# Patient Record
Sex: FEMALE | Race: BLACK OR AFRICAN AMERICAN | NOT HISPANIC OR LATINO | Employment: PART TIME | URBAN - METROPOLITAN AREA
[De-identification: names, ages, dates, MRNs, and addresses within clinical notes are randomized per-mention and may not be internally consistent; named-entity substitution may affect disease eponyms.]

---

## 2020-06-14 ENCOUNTER — COMMUNICATION - HEALTHEAST (OUTPATIENT)
Dept: SCHEDULING | Facility: CLINIC | Age: 30
End: 2020-06-14

## 2021-06-08 NOTE — TELEPHONE ENCOUNTER
"Myriam calls in and reports that she thinks she is about 6 months pregnant and has not established care with an ob provider. She has had some occasional abdominal pain and some spotting. She is somewhat edemic.  She is not sure if she is feeling the baby move much.  She is going to lay down on her left side and do some kick counts.  If she is not feeling baby move enough in the next few hours she should go to ER. She understands. Otherwise she will go to see a provider tomorrow to get ready for the baby.  She agrees to these plans.     Reason for Disposition    [1] Intermittent lower abdominal pain (e.g., cramping) AND [2] present > 24 hours    MILD vaginal bleeding (e.g., < 1 pad / hour) or SPOTTING    Pregnant 23 or more weeks and baby moving less today AND willing to perform kick count    Additional Information    Negative: Passed out (i.e., lost consciousness, collapsed and was not responding)    Negative: Shock suspected (e.g., cold/pale/clammy skin, too weak to stand, low BP, rapid pulse)    Negative: Difficult to awaken or acting confused (e.g., disoriented, slurred speech)    Negative: Sounds like a life-threatening emergency to the triager    Negative: Followed an abdomen (stomach) injury    Negative: [1] Abdominal pain AND [2] pregnant > 20 weeks    Negative: Passed tissue (e.g., gray-white)    Negative: [1] Vomiting AND [2] contains red blood or black (\"coffee ground\") material  (Exception: few red streaks in vomit that only happened once)    Negative: Shoulder pain    Negative: MODERATE-SEVERE abdominal pain (e.g., interferes with normal activities, awakens from sleep)    Negative: [1] SEVERE vaginal bleeding (i.e., soaking 2 pads / hour, large blood clots) AND [2] present 2 or more hours    Negative: [1] MODERATE vaginal bleeding (i.e., soaking 1 pad / hour; clots) AND [2] present > 6 hours    Negative: [1] MODERATE vaginal bleeding (i.e., soaking 1 pad / hour; clots) AND [2] pregnant > 12 weeks    " Negative: Lightheadedness or dizziness (e.g., feels like passing out)    Negative: Patient sounds very sick or weak to the triager    Negative: [1] Constant abdominal pain AND [2] present > 2 hours    Negative: Blood in urine (red, pink, or tea-colored)    Negative: Fever > 100.4 F (38.0 C)    Negative: White of the eyes have turned yellow (i.e., jaundice)    Negative: Sounds like a life-threatening emergency to the triager    Negative: Pregnant > 36 weeks (i.e., term) and having contractions or other symptoms of labor    Negative: Pregnant < 37 weeks (i.e., ) and having contractions or other symptoms of labor    Negative: Pregnant > 20 weeks and having abdominal pain    Negative: Pregnant > 20 weeks and having vaginal bleeding or spotting    Negative: Blurred vision or visual change    Negative: SEVERE headache and not relieved with acetaminophen (e.g., Tylenol)    Negative: Leakage of fluid from vagina    Protocols used: PREGNANCY - ABDOMINAL PAIN LESS THAN 20 WEEKS EGA-A-AH, PREGNANCY - DECREASED FETAL MOVEMENT-A-OH

## 2021-06-16 PROBLEM — D62 ANEMIA ASSOCIATED WITH ACUTE BLOOD LOSS: Status: ACTIVE | Noted: 2018-12-05

## 2021-06-16 PROBLEM — F43.10 PTSD (POST-TRAUMATIC STRESS DISORDER): Status: ACTIVE | Noted: 2019-10-02

## 2021-06-16 PROBLEM — F41.9 ANXIETY: Status: ACTIVE | Noted: 2019-10-02

## 2021-06-16 PROBLEM — Z87.820 HISTORY OF MULTIPLE CONCUSSIONS: Status: ACTIVE | Noted: 2018-12-12

## 2021-06-16 PROBLEM — F41.0 PANIC ATTACKS: Status: ACTIVE | Noted: 2019-10-02

## 2022-01-25 PROCEDURE — 96374 THER/PROPH/DIAG INJ IV PUSH: CPT

## 2022-01-25 PROCEDURE — 99285 EMERGENCY DEPT VISIT HI MDM: CPT | Mod: 25

## 2022-01-25 PROCEDURE — C9803 HOPD COVID-19 SPEC COLLECT: HCPCS

## 2022-01-25 ASSESSMENT — MIFFLIN-ST. JEOR: SCORE: 1207.57

## 2022-01-26 ENCOUNTER — APPOINTMENT (OUTPATIENT)
Dept: CT IMAGING | Facility: HOSPITAL | Age: 32
End: 2022-01-26
Attending: INTERNAL MEDICINE
Payer: COMMERCIAL

## 2022-01-26 ENCOUNTER — HOSPITAL ENCOUNTER (OUTPATIENT)
Facility: HOSPITAL | Age: 32
Setting detail: OBSERVATION
Discharge: HOME | End: 2022-01-29
Attending: EMERGENCY MEDICINE
Payer: COMMERCIAL

## 2022-01-26 ENCOUNTER — APPOINTMENT (OUTPATIENT)
Dept: RADIOLOGY | Facility: HOSPITAL | Age: 32
End: 2022-01-26
Attending: EMERGENCY MEDICINE
Payer: COMMERCIAL

## 2022-01-26 DIAGNOSIS — R65.10 SIRS (SYSTEMIC INFLAMMATORY RESPONSE SYNDROME) (H): ICD-10-CM

## 2022-01-26 DIAGNOSIS — D62 ANEMIA ASSOCIATED WITH ACUTE BLOOD LOSS: ICD-10-CM

## 2022-01-26 DIAGNOSIS — F10.220 ALCOHOL DEPENDENCE WITH UNCOMPLICATED INTOXICATION (H): Primary | ICD-10-CM

## 2022-01-26 PROBLEM — F10.20 ALCOHOL DEPENDENCE (H): Status: ACTIVE | Noted: 2020-07-01

## 2022-01-26 PROBLEM — K70.30 ALCOHOLIC CIRRHOSIS OF LIVER (H): Status: ACTIVE | Noted: 2020-11-12

## 2022-01-26 PROBLEM — F17.200 TOBACCO DEPENDENCE: Status: ACTIVE | Noted: 2020-07-01

## 2022-01-26 PROBLEM — F33.1 MAJOR DEPRESSIVE DISORDER, RECURRENT EPISODE, MODERATE WITH ANXIOUS DISTRESS (H): Status: ACTIVE | Noted: 2021-04-14

## 2022-01-26 LAB
ALBUMIN SERPL-MCNC: 3.1 G/DL (ref 3.5–5)
ALBUMIN UR-MCNC: 30 MG/DL
ALP SERPL-CCNC: 144 U/L (ref 45–120)
ALT SERPL W P-5'-P-CCNC: 26 U/L (ref 0–45)
AMMONIA PLAS-SCNC: 28 UMOL/L (ref 11–35)
ANION GAP SERPL CALCULATED.3IONS-SCNC: 16 MMOL/L (ref 5–18)
APPEARANCE UR: ABNORMAL
AST SERPL W P-5'-P-CCNC: 73 U/L (ref 0–40)
BASOPHILS # BLD AUTO: 0 10E3/UL (ref 0–0.2)
BASOPHILS NFR BLD AUTO: 1 %
BILIRUB SERPL-MCNC: 0.6 MG/DL (ref 0–1)
BILIRUB UR QL STRIP: NEGATIVE
BUN SERPL-MCNC: 8 MG/DL (ref 8–22)
CALCIUM SERPL-MCNC: 8.1 MG/DL (ref 8.5–10.5)
CHLORIDE BLD-SCNC: 107 MMOL/L (ref 98–107)
CO2 SERPL-SCNC: 21 MMOL/L (ref 22–31)
COLOR UR AUTO: YELLOW
CREAT SERPL-MCNC: 0.56 MG/DL (ref 0.6–1.1)
EOSINOPHIL # BLD AUTO: 0 10E3/UL (ref 0–0.7)
EOSINOPHIL NFR BLD AUTO: 1 %
ERYTHROCYTE [DISTWIDTH] IN BLOOD BY AUTOMATED COUNT: 22.5 % (ref 10–15)
ETHANOL SERPL-MCNC: 205 MG/DL
FLUAV RNA SPEC QL NAA+PROBE: NEGATIVE
FLUBV RNA RESP QL NAA+PROBE: NEGATIVE
FOLATE SERPL-MCNC: 6.3 NG/ML
GFR SERPL CREATININE-BSD FRML MDRD: >90 ML/MIN/1.73M2
GLUCOSE BLD-MCNC: 72 MG/DL (ref 70–125)
GLUCOSE UR STRIP-MCNC: NEGATIVE MG/DL
HCG UR QL: NEGATIVE
HCT VFR BLD AUTO: 26.3 % (ref 35–47)
HGB BLD-MCNC: 7.3 G/DL (ref 11.7–15.7)
HGB UR QL STRIP: ABNORMAL
HOLD SPECIMEN: NORMAL
IMM GRANULOCYTES # BLD: 0 10E3/UL
IMM GRANULOCYTES NFR BLD: 0 %
INR PPP: 1.07 (ref 0.85–1.15)
INTERNAL QC OK POCT: NORMAL
IRON SATN MFR SERPL: 4 % (ref 20–50)
IRON SERPL-MCNC: 16 UG/DL (ref 42–175)
KETONES UR STRIP-MCNC: 10 MG/DL
LACTATE SERPL-SCNC: 3.4 MMOL/L (ref 0.7–2)
LACTATE SERPL-SCNC: 4.4 MMOL/L (ref 0.7–2)
LEUKOCYTE ESTERASE UR QL STRIP: ABNORMAL
LIPASE SERPL-CCNC: 72 U/L (ref 0–52)
LYMPHOCYTES # BLD AUTO: 0.9 10E3/UL (ref 0.8–5.3)
LYMPHOCYTES NFR BLD AUTO: 26 %
MAGNESIUM SERPL-MCNC: 1.6 MG/DL (ref 1.8–2.6)
MAGNESIUM SERPL-MCNC: 1.9 MG/DL (ref 1.8–2.6)
MCH RBC QN AUTO: 21.2 PG (ref 26.5–33)
MCHC RBC AUTO-ENTMCNC: 27.8 G/DL (ref 31.5–36.5)
MCV RBC AUTO: 77 FL (ref 78–100)
MONOCYTES # BLD AUTO: 0.7 10E3/UL (ref 0–1.3)
MONOCYTES NFR BLD AUTO: 19 %
MUCOUS THREADS #/AREA URNS LPF: PRESENT /LPF
NEUTROPHILS # BLD AUTO: 1.9 10E3/UL (ref 1.6–8.3)
NEUTROPHILS NFR BLD AUTO: 53 %
NITRATE UR QL: NEGATIVE
NRBC # BLD AUTO: 0 10E3/UL
NRBC BLD AUTO-RTO: 0 /100
PH UR STRIP: 7 [PH] (ref 5–7)
PHOSPHATE SERPL-MCNC: 3.7 MG/DL (ref 2.5–4.5)
PLATELET # BLD AUTO: 226 10E3/UL (ref 150–450)
POCT KIT EXPIRATION DATE: NORMAL
POCT KIT LOT NUMBER: NORMAL
POTASSIUM BLD-SCNC: 3.8 MMOL/L (ref 3.5–5)
PROT SERPL-MCNC: 7.2 G/DL (ref 6–8)
RBC # BLD AUTO: 3.44 10E6/UL (ref 3.8–5.2)
RBC URINE: >182 /HPF
SARS-COV-2 RNA RESP QL NAA+PROBE: NEGATIVE
SODIUM SERPL-SCNC: 144 MMOL/L (ref 136–145)
SP GR UR STRIP: 1.02 (ref 1–1.03)
SQUAMOUS EPITHELIAL: <1 /HPF
TIBC SERPL-MCNC: 424 UG/DL (ref 313–563)
TRANSFERRIN SERPL-MCNC: 339 MG/DL (ref 212–360)
TSH SERPL DL<=0.005 MIU/L-ACNC: 0.92 UIU/ML (ref 0.3–5)
UROBILINOGEN UR STRIP-MCNC: 2 MG/DL
VIT B12 SERPL-MCNC: 372 PG/ML (ref 213–816)
WBC # BLD AUTO: 3.6 10E3/UL (ref 4–11)
WBC URINE: <1 /HPF

## 2022-01-26 PROCEDURE — 96361 HYDRATE IV INFUSION ADD-ON: CPT

## 2022-01-26 PROCEDURE — 85018 HEMOGLOBIN: CPT | Performed by: EMERGENCY MEDICINE

## 2022-01-26 PROCEDURE — 999N000127 HC STATISTIC PERIPHERAL IV START W US GUIDANCE

## 2022-01-26 PROCEDURE — 83735 ASSAY OF MAGNESIUM: CPT | Mod: 91 | Performed by: EMERGENCY MEDICINE

## 2022-01-26 PROCEDURE — 81001 URINALYSIS AUTO W/SCOPE: CPT | Performed by: EMERGENCY MEDICINE

## 2022-01-26 PROCEDURE — 87040 BLOOD CULTURE FOR BACTERIA: CPT | Performed by: EMERGENCY MEDICINE

## 2022-01-26 PROCEDURE — 71045 X-RAY EXAM CHEST 1 VIEW: CPT

## 2022-01-26 PROCEDURE — 99220 PR INITIAL OBSERVATION CARE,LEVEL III: CPT | Performed by: INTERNAL MEDICINE

## 2022-01-26 PROCEDURE — 250N000011 HC RX IP 250 OP 636: Performed by: EMERGENCY MEDICINE

## 2022-01-26 PROCEDURE — 36415 COLL VENOUS BLD VENIPUNCTURE: CPT | Performed by: EMERGENCY MEDICINE

## 2022-01-26 PROCEDURE — 84466 ASSAY OF TRANSFERRIN: CPT | Performed by: INTERNAL MEDICINE

## 2022-01-26 PROCEDURE — 85610 PROTHROMBIN TIME: CPT | Performed by: EMERGENCY MEDICINE

## 2022-01-26 PROCEDURE — 83690 ASSAY OF LIPASE: CPT | Performed by: EMERGENCY MEDICINE

## 2022-01-26 PROCEDURE — 83735 ASSAY OF MAGNESIUM: CPT | Performed by: INTERNAL MEDICINE

## 2022-01-26 PROCEDURE — 83605 ASSAY OF LACTIC ACID: CPT | Performed by: EMERGENCY MEDICINE

## 2022-01-26 PROCEDURE — 36415 COLL VENOUS BLD VENIPUNCTURE: CPT | Performed by: INTERNAL MEDICINE

## 2022-01-26 PROCEDURE — 80053 COMPREHEN METABOLIC PANEL: CPT | Performed by: EMERGENCY MEDICINE

## 2022-01-26 PROCEDURE — G0378 HOSPITAL OBSERVATION PER HR: HCPCS

## 2022-01-26 PROCEDURE — 74176 CT ABD & PELVIS W/O CONTRAST: CPT

## 2022-01-26 PROCEDURE — 250N000013 HC RX MED GY IP 250 OP 250 PS 637: Performed by: EMERGENCY MEDICINE

## 2022-01-26 PROCEDURE — 250N000013 HC RX MED GY IP 250 OP 250 PS 637: Performed by: INTERNAL MEDICINE

## 2022-01-26 PROCEDURE — 82607 VITAMIN B-12: CPT | Performed by: INTERNAL MEDICINE

## 2022-01-26 PROCEDURE — 81025 URINE PREGNANCY TEST: CPT | Performed by: INTERNAL MEDICINE

## 2022-01-26 PROCEDURE — 87636 SARSCOV2 & INF A&B AMP PRB: CPT | Performed by: EMERGENCY MEDICINE

## 2022-01-26 PROCEDURE — 82077 ASSAY SPEC XCP UR&BREATH IA: CPT | Performed by: EMERGENCY MEDICINE

## 2022-01-26 PROCEDURE — 99207 PR CDG-CODE CATEGORY CHANGED: CPT | Performed by: INTERNAL MEDICINE

## 2022-01-26 PROCEDURE — 258N000003 HC RX IP 258 OP 636: Performed by: EMERGENCY MEDICINE

## 2022-01-26 PROCEDURE — 83605 ASSAY OF LACTIC ACID: CPT | Performed by: INTERNAL MEDICINE

## 2022-01-26 PROCEDURE — 82746 ASSAY OF FOLIC ACID SERUM: CPT | Performed by: INTERNAL MEDICINE

## 2022-01-26 PROCEDURE — 82140 ASSAY OF AMMONIA: CPT | Performed by: EMERGENCY MEDICINE

## 2022-01-26 PROCEDURE — 84100 ASSAY OF PHOSPHORUS: CPT | Performed by: INTERNAL MEDICINE

## 2022-01-26 PROCEDURE — 84443 ASSAY THYROID STIM HORMONE: CPT | Performed by: INTERNAL MEDICINE

## 2022-01-26 RX ORDER — MULTIPLE VITAMINS W/ MINERALS TAB 9MG-400MCG
1 TAB ORAL DAILY
Status: DISCONTINUED | OUTPATIENT
Start: 2022-01-26 | End: 2022-01-29 | Stop reason: HOSPADM

## 2022-01-26 RX ORDER — LORAZEPAM 2 MG/ML
1-2 INJECTION INTRAMUSCULAR EVERY 30 MIN PRN
Status: DISCONTINUED | OUTPATIENT
Start: 2022-01-26 | End: 2022-01-29 | Stop reason: HOSPADM

## 2022-01-26 RX ORDER — LIDOCAINE 40 MG/G
CREAM TOPICAL
Status: DISCONTINUED | OUTPATIENT
Start: 2022-01-26 | End: 2022-01-29 | Stop reason: HOSPADM

## 2022-01-26 RX ORDER — CEFTRIAXONE 1 G/1
1 INJECTION, POWDER, FOR SOLUTION INTRAMUSCULAR; INTRAVENOUS EVERY 24 HOURS
Status: DISCONTINUED | OUTPATIENT
Start: 2022-01-27 | End: 2022-01-29 | Stop reason: HOSPADM

## 2022-01-26 RX ORDER — FOLIC ACID 1 MG/1
1 TABLET ORAL DAILY
Status: DISCONTINUED | OUTPATIENT
Start: 2022-01-26 | End: 2022-01-29 | Stop reason: HOSPADM

## 2022-01-26 RX ORDER — CEFTRIAXONE 1 G/1
1 INJECTION, POWDER, FOR SOLUTION INTRAMUSCULAR; INTRAVENOUS ONCE
Status: COMPLETED | OUTPATIENT
Start: 2022-01-26 | End: 2022-01-26

## 2022-01-26 RX ORDER — SODIUM CHLORIDE 9 MG/ML
INJECTION, SOLUTION INTRAVENOUS CONTINUOUS
Status: DISCONTINUED | OUTPATIENT
Start: 2022-01-26 | End: 2022-01-27

## 2022-01-26 RX ORDER — OLANZAPINE 5 MG/1
5-10 TABLET, ORALLY DISINTEGRATING ORAL EVERY 6 HOURS PRN
Status: DISCONTINUED | OUTPATIENT
Start: 2022-01-26 | End: 2022-01-29 | Stop reason: HOSPADM

## 2022-01-26 RX ORDER — HALOPERIDOL 5 MG/ML
2.5-5 INJECTION INTRAMUSCULAR EVERY 6 HOURS PRN
Status: DISCONTINUED | OUTPATIENT
Start: 2022-01-26 | End: 2022-01-29 | Stop reason: HOSPADM

## 2022-01-26 RX ORDER — LORAZEPAM 0.5 MG/1
1 TABLET ORAL ONCE
Status: COMPLETED | OUTPATIENT
Start: 2022-01-26 | End: 2022-01-26

## 2022-01-26 RX ORDER — LORAZEPAM 0.5 MG/1
1-2 TABLET ORAL EVERY 30 MIN PRN
Status: DISCONTINUED | OUTPATIENT
Start: 2022-01-26 | End: 2022-01-29 | Stop reason: HOSPADM

## 2022-01-26 RX ORDER — FLUMAZENIL 0.1 MG/ML
0.2 INJECTION, SOLUTION INTRAVENOUS
Status: DISCONTINUED | OUTPATIENT
Start: 2022-01-26 | End: 2022-01-29 | Stop reason: HOSPADM

## 2022-01-26 RX ORDER — CLONIDINE HYDROCHLORIDE 0.1 MG/1
0.1 TABLET ORAL EVERY 8 HOURS
Status: DISCONTINUED | OUTPATIENT
Start: 2022-01-26 | End: 2022-01-29 | Stop reason: HOSPADM

## 2022-01-26 RX ADMIN — SODIUM CHLORIDE: 9 INJECTION, SOLUTION INTRAVENOUS at 04:22

## 2022-01-26 RX ADMIN — LORAZEPAM 1 MG: 0.5 TABLET ORAL at 02:07

## 2022-01-26 RX ADMIN — CEFTRIAXONE 1 G: 1 INJECTION, POWDER, FOR SOLUTION INTRAMUSCULAR; INTRAVENOUS at 03:49

## 2022-01-26 RX ADMIN — Medication 100 MG: at 10:42

## 2022-01-26 RX ADMIN — FOLIC ACID 1 MG: 1 TABLET ORAL at 10:42

## 2022-01-26 RX ADMIN — CLONIDINE HYDROCHLORIDE 0.1 MG: 0.1 TABLET ORAL at 10:42

## 2022-01-26 RX ADMIN — Medication 1 TABLET: at 10:44

## 2022-01-26 RX ADMIN — CLONIDINE HYDROCHLORIDE 0.1 MG: 0.1 TABLET ORAL at 17:51

## 2022-01-26 RX ADMIN — SODIUM CHLORIDE: 9 INJECTION, SOLUTION INTRAVENOUS at 17:51

## 2022-01-26 RX ADMIN — SODIUM CHLORIDE 500 ML: 9 INJECTION, SOLUTION INTRAVENOUS at 03:28

## 2022-01-26 ASSESSMENT — MIFFLIN-ST. JEOR: SCORE: 1212.5

## 2022-01-26 ASSESSMENT — ACTIVITIES OF DAILY LIVING (ADL): DEPENDENT_IADLS:: TRANSPORTATION

## 2022-01-26 NOTE — CONSULTS
"Care Management Initial Consult    General Information  Assessment completed with: PatientMyriam  Type of CM/SW Visit: Initial Assessment    Primary Care Provider verified and updated as needed: Yes   Readmission within the last 30 days: no previous admission in last 30 days      Reason for Consult: discharge planning  Advance Care Planning: Advance Care Planning Reviewed: other (comment) (\"I don't have one\")          Communication Assessment  Patient's communication style: spoken language (English or Bilingual)    Hearing Difficulty or Deaf: no   Wear Glasses or Blind: no    Cognitive  Cognitive/Neuro/Behavioral: WDL                      Living Environment:   People in home: alone,parent(s),child(ralf), dependent,sibling(s) (\"mother, son age 12, brother\")     Current living Arrangements: house      Able to return to prior arrangements: yes       Family/Social Support:  Care provided by: self  Provides care for: child(ralf) (\"son age 12\")     Parent(s),Sibling(s)          Description of Support System: Supportive,Involved    Support Assessment: Adequate family and caregiver support,Adequate social supports,Patient communicates needs well met    Current Resources:   Patient receiving home care services: No     Community Resources: None  Equipment currently used at home: none  Supplies currently used at home: Other (\"glasses\")    Employment/Financial:  Employment Status: employed part-time (\"I work part time on weekends\")     Employment/ Comments: \"no  history\"  Financial Concerns:     Referral to Financial Counselor: No       Lifestyle & Psychosocial Needs:  Social Determinants of Health     Tobacco Use: High Risk     Smoking Tobacco Use: Current Every Day Smoker     Smokeless Tobacco Use: Unknown   Alcohol Use: Not on file   Financial Resource Strain: Not on file   Food Insecurity: Not on file   Transportation Needs: Not on file   Physical Activity: Not on file   Stress: Not on file   Social " Connections: Not on file   Intimate Partner Violence: Not on file   Depression: Not on file   Housing Stability: Not on file       Functional Status:  Prior to admission patient needed assistance:   Dependent ADLs:: Independent,Ambulation-no assistive device  Dependent IADLs:: Transportation       Mental Health Status:          Chemical Dependency Status:  Chemical Dependency Status: Current Concern             Values/Beliefs:  Spiritual, Cultural Beliefs, Church Practices, Values that affect care:                 Additional Information:  Myriam lives in a house with her son age 12, mother, and brother. She is independent with with ADLs at baseline. She does not drive.    She will need CD resources before discharge.    Family to transport at discharge.    What is Observation given.    Olga Snow RN

## 2022-01-26 NOTE — H&P
ADMISSION HISTORY & PHYSICAL    CODE STATUS:  Full code  Shantell Ruffin, [phone]    Patient YOB: 1990  Admit date: 1/26/2022  Date of Service: 1/26/2022    ASSESSMENT AND PLAN:  31 year old female with PMH of alcohol abuse, liver cirrhosis, chronic anemia, PTSD who presents to our ED for evaluation of weakness, tremors and abdominal pain:    Generalized weakness  Anemia  -- Anemia is microcytic with MCV of 77. B12 was normal on 3/10/21  -- Hgb of 7.3 noted. Check iron studies, TSH, B12 and folate level. Check FOBT  -- At risk for variceal bleeds given her h/o alcohol induced liver disease and portal hypertension    Alcohol abuse  -- BAL noted on admission at 205  -- Advised to quit.  consult  -- High risk for withdrawal. Start CIWA protocol    Alcohol induced liver disease  -- Abnormal LFTs noted  -- Follow up with GI recommended    Lactic acidosis  Fever  -- Patient had a fever of 100.5F in ED.   -- Source of infection is unclear at the moment. Chest xray- negative. Check UA. Check procal. Will tap ascites if she has any.    Disposition:  -Anticipated Length of Stay in midnights and medical necessity (including a midnight in the Emergency Department after triage if applicable): >2      CHIEF COMPLAINT:  Generalized weakness, abdominal pain and tremors    HISTORY OF PRESENTING ILLNESS:  31 year old female with PMH of alcohol abuse, liver cirrhosis, chronic anemia, PTSD who presents to our ED for evaluation of weakness, tremors and abdominal pain    Patient is a poor historian. Patient has felt unwell for the past several days, but became worse last night. Reports lower abdominal pain, generalized weakness, dizziness, tremors, and feels very cold. Has a history of liver disease related to alcohol use, last drank ~3 days ago. Not vaccinated for Covid-19. She has also had increased anxiety recently and has had 3 panic attacks within the past 1 week. Otherwise denies decreased  appetite, nausea, vomiting, urinary symptoms, or any other complaints at this time. Does not take any daily prescription medications.    PMH/PSH:  Patient Active Problem List   Diagnosis     Anxiety     Anemia associated with acute blood loss     Obesity     History of multiple concussions     PTSD (post-traumatic stress disorder)     Panic attacks     Alcohol dependence (H)     Alcoholic cirrhosis of liver (H)     Major depressive disorder, recurrent episode, moderate with anxious distress (H)     Normal spontaneous vaginal delivery     Tobacco dependence     SIRS (systemic inflammatory response syndrome) (H)       Past Medical History:   Diagnosis Date     Anxiety      Obesity         History reviewed. No pertinent surgical history.       ALLERGIES:  No Known Allergies    MEDICATIONS:  Reviewed.  Current Facility-Administered Medications   Medication     sodium chloride 0.9% infusion     No current outpatient medications on file.       Current Facility-Administered Medications:      [COMPLETED] 0.9% sodium chloride BOLUS, 500 mL, Intravenous, Once, Stopped at 01/26/22 0348 **FOLLOWED BY** sodium chloride 0.9% infusion, , Intravenous, Continuous, Dallas Ramos MD, Last Rate: 125 mL/hr at 01/26/22 0422, Started at 01/26/22 0422  No current outpatient medications on file.   Scheduled Meds:  Continuous Infusions:    sodium chloride 125 mL/hr at 01/26/22 0422     PRN Meds:.    SOCIAL HISTORY:  Social History     Socioeconomic History     Marital status: Single     Spouse name: Not on file     Number of children: Not on file     Years of education: Not on file     Highest education level: Not on file   Occupational History     Not on file   Tobacco Use     Smoking status: Not on file     Smokeless tobacco: Not on file   Substance and Sexual Activity     Alcohol use: Not on file     Drug use: Not on file     Sexual activity: Not on file   Other Topics Concern     Not on file   Social History Narrative     Not on file      Social Determinants of Health     Financial Resource Strain: Not on file   Food Insecurity: Not on file   Transportation Needs: Not on file   Physical Activity: Not on file   Stress: Not on file   Social Connections: Not on file   Intimate Partner Violence: Not on file   Housing Stability: Not on file       FAMILY HISTORY:  Reviewed and is not pertinent to this admission    ROS:  12 point review of systems reviewed and is negative except for what has already been mentioned in HPI.       PHYSICAL EXAM:  GENRL:  Not in acute distress   /61   Pulse 88   Temp 99.9  F (37.7  C) (Oral)   Resp 22   Ht 1.524 m (5')   Wt 57.6 kg (127 lb)   SpO2 99%   BMI 24.80 kg/m    No intake/output data recorded.  No intake/output data recorded.  HEENT: NC/AT      Eyes-  Pupil round and reactive to light bilaterally       Neck- supple, no JVP elevation,       Sclera- anicteric      Oropharynx- moist and pink  CHEST: Clear to auscultation bilateral anteriorly, no ronchi or wheezing  HEART: S1S2 normal, regular.   ABDMN: Soft. Non-tender, non-distended.  No guarding or rigidity. Bowel sounds- active  EXTRM: No pedal edema   INTGM: No skin rash, no cyanosis or clubbing  MUSCULOSKELETAL: no joint tenderness or swelling on upper and lower extremities  NEURO: Alert and awake. Cranial nerves II-XII grossly intact. No focal neurological deficit.  PSYCH:     GENITOURINARY:       DIAGNOSTIC DATA:    Recent Labs   Lab 01/26/22  0155   WBC 3.6*   HGB 7.3*   HCT 26.3*          Recent Labs   Lab 01/26/22  0155      CO2 21*   BUN 8       Recent Labs   Lab 01/26/22  0155   INR 1.07       Recent Labs   Lab 01/26/22  0155      CO2 21*   BUN 8   ALBUMIN 3.1*   ALKPHOS 144*   ALT 26   AST 73*       XR Chest Port 1 View    Result Date: 1/26/2022  EXAM: XR CHEST PORT 1 VIEW LOCATION: [facility] DATE/TIME: 1/26/2022 3:42 AM INDICATION: fever COMPARISON: 2/17/2020     IMPRESSION: Negative  chest.      Patient's new lab studies reviewed personally.  Patient's new radiology reports reviewed personally.  I personally viewed and personally interpreted patient's EKG:    Note created using dragon voice recognition software.  Errors in spelling or words which seems out of context are unintentional.  Sounds alike errors may have escaped editing.     01/26/2022  Saleem Kapoor MD  HOSPITALIST, [facility]  PAGER NO. [phone]

## 2022-01-26 NOTE — ED PROVIDER NOTES
EMERGENCY DEPARTMENT ENCOUNTER            IMPRESSION:  Acute alcohol intoxication  History of cirrhosis  SIRS      MEDICAL DECISION MAKING:  Patient brought in for evaluation of generalized illness.  She reported chills and lower abdominal pain.  She has become increasingly weak and unable to ambulate.  She has a history of chronic alcohol abuse and cirrhosis.  She initially denied recent alcohol intake.     On exam she appeared moderately ill.  She is febrile and tachycardic.  She had difficulty with transfer from the wheelchair to the [facility].  She is awake and alert and responsive. She had upper extremity tremor. She appears to hydrated.  There is no abdominal tenderness.    An IV was established and she was given fluid bolus.    She was given a dose of Ativan for suspected alcohol withdrawal, however her blood alcohol was 205    Lipase slightly elevated    Chemistry is unremarkable.  LFTs are slightly elevated but down from baseline    Flu and Covid are negative    Lactic acid is elevated at 4.4    CBC has normal white blood cell count and baseline anemia    Urinalysis was ordered and results are pending    Chest x-ray is negative for pneumonia    Blood cultures were ordered    Entire picture here is not clear.  She is obviously intoxicated and this is contributing to some of her symptoms.    She has evidence of SIRS with the fever and elevated lactate.  I have given a dose of broad-spectrum antibiotics.    I spoke with the hospitalist for admission      =================================================================  CHIEF COMPLAINT:  Chief Complaint   Patient presents with     Generalized Weakness     Anxiety         HPI  Myriam Porter is a 31 year old female with a history of alcohol dependence, alcoholic liver cirrhosis, tobacco dependence, anxiety, panic attacks, and anemia who presents to the ED with mother for evaluation of abdominal pain, generalized weakness, and tremors.     Patient has felt  unwell for the past several days, but became worse tonight. Reports lower abdominal pain, generalized weakness, dizziness, tremors, and feels very cold. Has a history of liver disease related to alcohol use, last drank ~3 days ago. Unsure if she has ever had alcohol withdrawal before. Not vaccinated for Covid-19.     She has also had increased anxiety recently and has had 3 panic attacks within the past 1 week. Otherwise denies decreased appetite, nausea, vomiting, urinary symptoms, or any other complaints at this time. Does not take any daily prescription medications.       I, Cathy Roxanne am serving as a scribe to document services personally performed by Dr. Dallas Ramos MD, based on my observation and the provider's statements to me. I, Dr. Dallas Ramos MD attest that Cathy Oliveira is acting in a scribe capacity, has observed my performance of the services and has documented them in accordance with my direction.      REVIEW OF SYSTEMS   Constitutional: Denies fever, chills, unintentional weight loss, or decreased appetite. Reports generalized weakness  Eyes: Denies visual changes or discharge    HENT: Denies sore throat, ear pain or neck pain  Respiratory: Denies cough. Reports shortness of breath    Cardiovascular: Denies chest pain, palpitations or leg swelling  GI: Denies nausea, vomiting, or dark, bloody stools. Reports lower abdominal pain.  : Denies hematuria, dysuria, or flank pain  Musculoskeletal: Denies any new joint pains. Reports diffuse back pain  Skin: Denies rash or wound  Neurologic: Denies current headache, new focal weakness, or sensory changes. Reports dizziness and tremors  Lymphatic: Denies swollen glands    Psychiatric: Denies depression, suicidal ideation or homicidal ideation. Reports anxiety    Remainder of systems reviewed, unless noted in HPI all others negative.      PAST MEDICAL HISTORY:  Past Medical History:   Diagnosis Date     Anxiety      Obesity        PAST SURGICAL  HISTORY:  History reviewed. No pertinent surgical history.      CURRENT MEDICATIONS:    No current outpatient medications on file.      ALLERGIES:  No Known Allergies    FAMILY HISTORY:  History reviewed. No pertinent family history.    SOCIAL HISTORY:   Social History     Socioeconomic History     Marital status: Single     Spouse name: Not on file     Number of children: Not on file     Years of education: Not on file     Highest education level: Not on file   Occupational History     Not on file   Tobacco Use     Smoking status: Not on file     Smokeless tobacco: Not on file   Substance and Sexual Activity     Alcohol use: Not on file     Drug use: Not on file     Sexual activity: Not on file   Other Topics Concern     Not on file   Social History Narrative     Not on file     Social Determinants of Health     Financial Resource Strain: Not on file   Food Insecurity: Not on file   Transportation Needs: Not on file   Physical Activity: Not on file   Stress: Not on file   Social Connections: Not on file   Intimate Partner Violence: Not on file   Housing Stability: Not on file       PHYSICAL EXAM:    /64   Pulse 97   Temp (!) 100.5  F (38.1  C) (Temporal)   Resp 22   Ht 1.524 m (5')   Wt 57.6 kg (127 lb)   SpO2 99%   BMI 24.80 kg/m      Constitutional: Awake, alert, appears moderately ill  Head: Normocephalic, atraumatic.  ENT: Mucous membranes moist. Posterior oropharynx appears normal.  Eyes: Pupils midrange and reactive ,no conjunctival discharge  Neck: No lymphadenopathy, no stridor, supple, soft tissue swelling  Chest: No tenderness   Respiratory: Respirations even, unlabored. Lungs clear to ascultation bilaterally, in no acute respiratory distress.  Cardiovascular: Regular rate and rhythm.+2 radial pulses, equal bilaterally.  No murmurs.   GI: Abdomen soft, non-tender to palpation in all 4 quadrants. No guarding or rebound. Bowel sounds intact on all 4 quadrants.   Back: No CVA tenderness.     Musculoskeletal: She seems generally weak.  Upper extremity tremor  Integument: Warm, dry. No rash. No bruising or petechiae.  Skin feels warm  Lymphatic: No cervical lymphadenopathy  Neurologic: Alert & oriented x 3. Normal speech.  Extremities are diffusely weak  Psychiatric: Normal mood and affect. Normal judgement.    ED COURSE:    1:06AM I met with the patient for the initial interview and physical examination. Discussed plan for treatment and workup in the ED. PPE: Provider wore surgical mask.  4:32 AM I reassessed the patient and updated her on the results. I discussed admission with the patient. Patient is agreeable. All questions answered fully.     LAB:  All pertinent labs reviewed and interpreted.  Results for orders placed or performed during the hospital encounter of 01/26/22   Result Value Ref Range    INR 1.07 0.85 - 1.15   Comprehensive metabolic panel   Result Value Ref Range    Sodium 144 136 - 145 mmol/L    Potassium 3.8 3.5 - 5.0 mmol/L    Chloride 107 98 - 107 mmol/L    Carbon Dioxide (CO2) 21 (L) 22 - 31 mmol/L    Anion Gap 16 5 - 18 mmol/L    Urea Nitrogen 8 8 - 22 mg/dL    Creatinine 0.56 (L) 0.60 - 1.10 mg/dL    Calcium 8.1 (L) 8.5 - 10.5 mg/dL    Glucose 72 70 - 125 mg/dL    Alkaline Phosphatase 144 (H) 45 - 120 U/L    AST 73 (H) 0 - 40 U/L    ALT 26 0 - 45 U/L    Protein Total 7.2 6.0 - 8.0 g/dL    Albumin 3.1 (L) 3.5 - 5.0 g/dL    Bilirubin Total 0.6 0.0 - 1.0 mg/dL    GFR Estimate >90 >60 mL/min/1.73m2   Result Value Ref Range    Lipase 72 (H) 0 - 52 U/L   Lactic acid whole blood   Result Value Ref Range    Lactic Acid 4.4 (HH) 0.7 - 2.0 mmol/L   Result Value Ref Range    Ammonia 28 11 - 35 umol/L   Result Value Ref Range    Magnesium 1.9 1.8 - 2.6 mg/dL   Ethyl Alcohol Level   Result Value Ref Range    Alcohol, Blood 205 (H) None detected mg/dL   Symptomatic; Unknown Influenza A/B & SARS-CoV2 (COVID-19) Virus PCR Multiplex Nasopharyngeal    Specimen: Nasopharyngeal; Swab   Result  Value Ref Range    Influenza A PCR Negative Negative    Influenza B PCR Negative Negative    SARS CoV2 PCR Negative Negative   CBC with platelets and differential   Result Value Ref Range    WBC Count 3.6 (L) 4.0 - 11.0 10e3/uL    RBC Count 3.44 (L) 3.80 - 5.20 10e6/uL    Hemoglobin 7.3 (L) 11.7 - 15.7 g/dL    Hematocrit 26.3 (L) 35.0 - 47.0 %    MCV 77 (L) 78 - 100 fL    MCH 21.2 (L) 26.5 - 33.0 pg    MCHC 27.8 (L) 31.5 - 36.5 g/dL    RDW 22.5 (H) 10.0 - 15.0 %    Platelet Count 226 150 - 450 10e3/uL    % Neutrophils 53 %    % Lymphocytes 26 %    % Monocytes 19 %    % Eosinophils 1 %    % Basophils 1 %    % Immature Granulocytes 0 %    NRBCs per 100 WBC 0 <1 /100    Absolute Neutrophils 1.9 1.6 - 8.3 10e3/uL    Absolute Lymphocytes 0.9 0.8 - 5.3 10e3/uL    Absolute Monocytes 0.7 0.0 - 1.3 10e3/uL    Absolute Eosinophils 0.0 0.0 - 0.7 10e3/uL    Absolute Basophils 0.0 0.0 - 0.2 10e3/uL    Absolute Immature Granulocytes 0.0 <=0.4 10e3/uL    Absolute NRBCs 0.0 10e3/uL       RADIOLOGY:  Reviewed all pertinent imaging. Please see official radiology report.  XR Chest Port 1 View    (Results Pending)            MEDICATIONS GIVEN IN THE EMERGENCY:  Medications   0.9% sodium chloride BOLUS (0 mLs Intravenous Stopped 1/26/22 0348)     Followed by   sodium chloride 0.9% infusion (has no administration in time range)   cefTRIAXone (ROCEPHIN) 1 g vial to attach to  mL bag for ADULTS or NS 50 mL bag for PEDS (1 g Intravenous New Bag 1/26/22 0349)   LORazepam (ATIVAN) tablet 1 mg (1 mg Oral Given 1/26/22 0207)           NEW PRESCRIPTIONS STARTED AT TODAY'S ER VISIT:  New Prescriptions    No medications on file          FINAL DIAGNOSIS:    ICD-10-CM    1. SIRS (systemic inflammatory response syndrome) (H)  R65.10             At the conclusion of the encounter I discussed the results of all of the tests and the disposition. The questions were answered. The patient or family acknowledged understanding and was agreeable with  the care plan.     NAME: Myriam Porter  AGE: 31 year old female  YOB: 1990  MRN: [id number]  EVALUATION DATE & TIME: No admission date for patient encounter.    PCP: Shantell Ruffin    ED PROVIDER: Dallas Ramos M.D.      Cathy TRIMBLE, am serving as a scribe to document services personally performed by Dr. Dallas Ramos based on my observation and the provider's statements to me. I, Dallas Ramos MD attest that Cathy Oliveira is acting in a scribe capacity, has observed my performance of the services and has documented them in accordance with my direction.    Dallas Ramos M.D.  Emergency Medicine  [facility] EMERGENCY DEPARTMENT  [facility] [place] [address]  [phone]  Dept: [phone]  1/26/2022       Dallas Ramos MD  01/26/22 0520

## 2022-01-26 NOTE — ED NOTES
"ER BOARDING NOTE:    Drowsy, sleeping.  Wakens to voice.  Oriented.  Having oral tremors upon oral temperature assessment.  Oriented.  CIWA score 3.    On RA, sats are 100%.  No distress.  HR 80s.  No CP.  Wants to eat, \"I haven't eaten since yesterday.\"  C/o mild nausea.   Haven't voided.  Will need specimen.  VS obtained: /72   Pulse 81   Temp 98.3  F (36.8  C) (Oral)   Resp 18   Ht 1.524 m (5')   Wt 57.6 kg (127 lb)   SpO2 100%   BMI 24.80 kg/m  .  NS 125mL/hr via 18g RAC.  Plan: waiting for IP bed.   "

## 2022-01-26 NOTE — PROGRESS NOTES
Call from lab for critical hemoglobin of 6.4. No source of bleeding and did receive bolus and maintenance fluids, so likely dilutional. Rest of CBC not back yet so can't see other cell lines to confirm.     Spoke with patient about transfusion. She has had in the past and did not have a good experience. States that the IV kept leaking and she had to get it done several times. Felt like a lot of pressure going in at once. Discussed risks and benefits. She is going to think about it.    Will revisit in 15 minutes.    Colt Jackson MD - PGY2  [facility]  P: [phone]    -------------    11:16 AM    Patient still thinking. Wants to wait until mom is here to discuss with her, which I think is reasonable given the above. Will re-visit in an hour.    -------------    12:48 PM    Spoke to lab as to why hemoglobin still isn't in the results. Gustavo reported that it was ran off of lactic acid. It still needs to be reported to a provider if critical but since it wasn't specifically ordered it isn't added to the results section. Myriam's mom still not here to discuss. Pt thinks she is on her way.

## 2022-01-26 NOTE — PHARMACY-ADMISSION MEDICATION HISTORY
Admission Medication History Completed by Pharmacy    See [facility] Admission Navigator for allergy information, preferred outpatient pharmacy, prior to admission medications and immunization status.     Medication History Sources:     Patient    Care Everywhere records    Rx refill hx    Changes made to PTA medication list (reason):    Added: None    Deleted: None    Changed: None    Additional Information:    None    Prior to Admission medications    Not on File       Date completed: 01/26/22    Medication history completed by:   Buffy Krishnamurthy PharmD  January 26, 2022

## 2022-01-26 NOTE — ED NOTES
[facility] ED Handoff Report    ED Chief Complaint: generalized illness    ED Diagnosis:  (R65.10) SIRS (systemic inflammatory response syndrome) (H)  Comment: +fever and elevated lactate.  No fevers today.  Repeat lactate of 3.4.    Plan: Fever control and IVF.         PMH:    Past Medical History:   Diagnosis Date     Anxiety      Obesity         Code Status:  Full Code     Falls Risk: No Band: Not applicable    Current Living Situation/Residence: lives with family.     Elimination Status: Continent: Yes.  Has UTI.       Activity Level: SBA    Patients Preferred Language:  English     Needed: No    Vital Signs:  /72   Pulse 81   Temp 98.3  F (36.8  C) (Oral)   Resp 18   Ht 1.524 m (5')   Wt 57.6 kg (127 lb)   SpO2 100%   BMI 24.80 kg/m       Cardiac Rhythm: NSR    Pain Score: 0/10    Is the Patient Confused:  No    Last Food or Drink: 01/26/22 at lunch    Focused Assessment:  Strength, LFTs    Tests Performed: Done: Labs and Imaging    Treatments Provided:  MIYA 3, 1.     Family Dynamics/Concerns: Yes; please explain: Mom in waiting room trying to get in despite visitor policy.     Family Updated On Visitor Policy: Yes    Plan of Care Communicated to Family: Yes    Who Was Updated about Plan of Care: Mom    Belongings Checklist Done and Signed by Patient: No    Medications sent with patient: none    Covid: asymptomatic , negative    Additional Information: JACIEL + upon arrival.

## 2022-01-26 NOTE — ED TRIAGE NOTES
Patient reports weakness for last couple of months. States it is difficult to stand up and often need to sit back down due to feeling lightheaded. Patient also reports increased anxiety and states she has had 3 panic attacks this past week. Patient also reports abd pain and numbness in her lower back.

## 2022-01-27 LAB
ALBUMIN SERPL-MCNC: 2.6 G/DL (ref 3.5–5)
ALP SERPL-CCNC: 134 U/L (ref 45–120)
ALT SERPL W P-5'-P-CCNC: 23 U/L (ref 0–45)
ANION GAP SERPL CALCULATED.3IONS-SCNC: 8 MMOL/L (ref 5–18)
AST SERPL W P-5'-P-CCNC: 90 U/L (ref 0–40)
BASOPHILS # BLD AUTO: 0 10E3/UL (ref 0–0.2)
BASOPHILS NFR BLD AUTO: 1 %
BILIRUB SERPL-MCNC: 0.4 MG/DL (ref 0–1)
BUN SERPL-MCNC: 4 MG/DL (ref 8–22)
CALCIUM SERPL-MCNC: 7.9 MG/DL (ref 8.5–10.5)
CHLORIDE BLD-SCNC: 107 MMOL/L (ref 98–107)
CO2 SERPL-SCNC: 22 MMOL/L (ref 22–31)
CREAT SERPL-MCNC: 0.58 MG/DL (ref 0.6–1.1)
EOSINOPHIL # BLD AUTO: 0.1 10E3/UL (ref 0–0.7)
EOSINOPHIL NFR BLD AUTO: 2 %
ERYTHROCYTE [DISTWIDTH] IN BLOOD BY AUTOMATED COUNT: 21.6 % (ref 10–15)
ERYTHROCYTE [DISTWIDTH] IN BLOOD BY AUTOMATED COUNT: 22.1 % (ref 10–15)
GFR SERPL CREATININE-BSD FRML MDRD: >90 ML/MIN/1.73M2
GLUCOSE BLD-MCNC: 106 MG/DL (ref 70–125)
HCT VFR BLD AUTO: 23 % (ref 35–47)
HCT VFR BLD AUTO: 23.7 % (ref 35–47)
HGB BLD-MCNC: 6.4 G/DL (ref 11.7–15.7)
HGB BLD-MCNC: 6.5 G/DL (ref 11.7–15.7)
IMM GRANULOCYTES # BLD: 0 10E3/UL
IMM GRANULOCYTES NFR BLD: 0 %
LACTATE SERPL-SCNC: 1.2 MMOL/L (ref 0.7–2)
LYMPHOCYTES # BLD AUTO: 1.1 10E3/UL (ref 0.8–5.3)
LYMPHOCYTES NFR BLD AUTO: 31 %
MAGNESIUM SERPL-MCNC: 1.6 MG/DL (ref 1.8–2.6)
MCH RBC QN AUTO: 21.2 PG (ref 26.5–33)
MCH RBC QN AUTO: 21.3 PG (ref 26.5–33)
MCHC RBC AUTO-ENTMCNC: 27.4 G/DL (ref 31.5–36.5)
MCHC RBC AUTO-ENTMCNC: 27.8 G/DL (ref 31.5–36.5)
MCV RBC AUTO: 77 FL (ref 78–100)
MCV RBC AUTO: 78 FL (ref 78–100)
MONOCYTES # BLD AUTO: 0.6 10E3/UL (ref 0–1.3)
MONOCYTES NFR BLD AUTO: 17 %
NEUTROPHILS # BLD AUTO: 1.8 10E3/UL (ref 1.6–8.3)
NEUTROPHILS NFR BLD AUTO: 49 %
NRBC # BLD AUTO: 0 10E3/UL
NRBC BLD AUTO-RTO: 0 /100
PATH REPORT.COMMENTS IMP SPEC: NORMAL
PATH REPORT.FINAL DX SPEC: NORMAL
PATH REPORT.MICROSCOPIC SPEC OTHER STN: NORMAL
PATH REPORT.RELEVANT HX SPEC: NORMAL
PLATELET # BLD AUTO: 160 10E3/UL (ref 150–450)
PLATELET # BLD AUTO: 174 10E3/UL (ref 150–450)
POTASSIUM BLD-SCNC: 3.6 MMOL/L (ref 3.5–5)
PROCALCITONIN SERPL-MCNC: 0.05 NG/ML (ref 0–0.49)
PROT SERPL-MCNC: 5.9 G/DL (ref 6–8)
RBC # BLD AUTO: 3 10E6/UL (ref 3.8–5.2)
RBC # BLD AUTO: 3.06 10E6/UL (ref 3.8–5.2)
RETICS # AUTO: 0.06 10E6/UL (ref 0.01–0.11)
RETICS/RBC NFR AUTO: 1.8 % (ref 0.8–2.7)
SODIUM SERPL-SCNC: 137 MMOL/L (ref 136–145)
WBC # BLD AUTO: 3.6 10E3/UL (ref 4–11)
WBC # BLD AUTO: 3.9 10E3/UL (ref 4–11)

## 2022-01-27 PROCEDURE — 36415 COLL VENOUS BLD VENIPUNCTURE: CPT | Performed by: PHYSICIAN ASSISTANT

## 2022-01-27 PROCEDURE — 250N000011 HC RX IP 250 OP 636: Performed by: INTERNAL MEDICINE

## 2022-01-27 PROCEDURE — 82040 ASSAY OF SERUM ALBUMIN: CPT | Performed by: INTERNAL MEDICINE

## 2022-01-27 PROCEDURE — G0378 HOSPITAL OBSERVATION PER HR: HCPCS

## 2022-01-27 PROCEDURE — 99226 PR SUBSEQUENT OBSERVATION CARE,LEVEL III: CPT | Performed by: INTERNAL MEDICINE

## 2022-01-27 PROCEDURE — 86364 TISS TRNSGLTMNASE EA IG CLAS: CPT | Mod: 59 | Performed by: INTERNAL MEDICINE

## 2022-01-27 PROCEDURE — 84145 PROCALCITONIN (PCT): CPT | Performed by: INTERNAL MEDICINE

## 2022-01-27 PROCEDURE — 83605 ASSAY OF LACTIC ACID: CPT | Performed by: PHYSICIAN ASSISTANT

## 2022-01-27 PROCEDURE — 85045 AUTOMATED RETICULOCYTE COUNT: CPT | Performed by: INTERNAL MEDICINE

## 2022-01-27 PROCEDURE — 36415 COLL VENOUS BLD VENIPUNCTURE: CPT | Performed by: INTERNAL MEDICINE

## 2022-01-27 PROCEDURE — 250N000013 HC RX MED GY IP 250 OP 250 PS 637: Performed by: INTERNAL MEDICINE

## 2022-01-27 PROCEDURE — 85060 BLOOD SMEAR INTERPRETATION: CPT | Performed by: PATHOLOGY

## 2022-01-27 PROCEDURE — 258N000003 HC RX IP 258 OP 636: Performed by: EMERGENCY MEDICINE

## 2022-01-27 PROCEDURE — 96376 TX/PRO/DX INJ SAME DRUG ADON: CPT

## 2022-01-27 PROCEDURE — 83735 ASSAY OF MAGNESIUM: CPT | Performed by: INTERNAL MEDICINE

## 2022-01-27 PROCEDURE — 96375 TX/PRO/DX INJ NEW DRUG ADDON: CPT

## 2022-01-27 PROCEDURE — 85027 COMPLETE CBC AUTOMATED: CPT | Performed by: INTERNAL MEDICINE

## 2022-01-27 PROCEDURE — 82784 ASSAY IGA/IGD/IGG/IGM EACH: CPT | Performed by: INTERNAL MEDICINE

## 2022-01-27 PROCEDURE — 258N000003 HC RX IP 258 OP 636: Performed by: INTERNAL MEDICINE

## 2022-01-27 PROCEDURE — 80053 COMPREHEN METABOLIC PANEL: CPT | Performed by: INTERNAL MEDICINE

## 2022-01-27 RX ORDER — PANTOPRAZOLE SODIUM 40 MG/1
40 TABLET, DELAYED RELEASE ORAL
Status: DISCONTINUED | OUTPATIENT
Start: 2022-01-28 | End: 2022-01-29 | Stop reason: HOSPADM

## 2022-01-27 RX ADMIN — CLONIDINE HYDROCHLORIDE 0.1 MG: 0.1 TABLET ORAL at 10:45

## 2022-01-27 RX ADMIN — FOLIC ACID 1 MG: 1 TABLET ORAL at 09:57

## 2022-01-27 RX ADMIN — Medication 1 TABLET: at 09:57

## 2022-01-27 RX ADMIN — SODIUM CHLORIDE: 9 INJECTION, SOLUTION INTRAVENOUS at 01:46

## 2022-01-27 RX ADMIN — CEFTRIAXONE SODIUM 1 G: 1 INJECTION, POWDER, FOR SOLUTION INTRAMUSCULAR; INTRAVENOUS at 04:27

## 2022-01-27 RX ADMIN — IRON SUCROSE 200 MG: 20 INJECTION, SOLUTION INTRAVENOUS at 13:02

## 2022-01-27 RX ADMIN — LORAZEPAM 1 MG: 0.5 TABLET ORAL at 22:04

## 2022-01-27 RX ADMIN — Medication 100 MG: at 09:58

## 2022-01-27 RX ADMIN — CLONIDINE HYDROCHLORIDE 0.1 MG: 0.1 TABLET ORAL at 18:24

## 2022-01-27 NOTE — PLAN OF CARE
Denies pain/nausea. LS clear. BS active.     Mg protocol.    Scoring on the CIWA protocol for tremors, headache, and intermittent tactile disturbances.     Bed alarm on for safety. Call light within reach. Up with standby assist. Able to make needs known.

## 2022-01-27 NOTE — PROGRESS NOTES
Daily Progress Note        CODE STATUS:  Full Code    01/27/22  Assessment/Plan:  31 year old female with PMH of alcohol abuse, liver cirrhosis, chronic anemia, PTSD who presents to our ED for evaluation of weakness, tremors and abdominal pain:     Generalized weakness  Anemia  -- Anemia is microcytic with MCV of 77. B12 was normal on 3/10/21  -- Hgb of 7.3 noted on admission. Now down to 6.4. This is likely due to dilution. No active bleeding besides her regular menses. Discussed about blood transfusion although she signed the consent for transfusion, somehow she is very hesitant about blood transfusion. She stated she would let us know if she wants transfusion after talking to her mother  -- Checked iron studies- deficient in iron with serum iron level of 16, and transferrin saturation at 4%. Patient is ok with iron infusion  -- TSH, B12 and folate level- normal.  -- At risk for variceal bleeds given her h/o alcohol induced liver disease and portal hypertension. CT abd/pelvis done on admission showed an ovoid lesion associated with the 4th segment of the duodenum. Will consult GI for their recommendations     Alcohol abuse  -- BAL noted on admission at 205  -- Advised to quit.  consult  -- High risk for withdrawal. Start CIWA protocol     Alcohol induced liver disease  -- Abnormal LFTs noted  -- Follow up with GI recommended     Lactic acidosis  Fever  -- Patient had a fever of 100.5F in ED.   -- Source of infection is unclear at the moment. Chest xray- negative. UA noted. Microscopic hematuria is likely due to menstruaion.   -- Check procal. If negative, will stop the antibiotics      Disposition; Likely home tomorrow if better, and no work up per GI  Barrier to discharge; Microcytic anemia. Alcohol withdrawal.     LOS: 0 days     Subjective:  Interval History: Patient seen and examined. Notes, labs, imaging reports personally reviewed. Patient reports doing better. As we discussed about her low hgb,  she states she is having her period now. She reports regular monthly period, and it lasts for about 6 days. Patient is hesitant about blood transfusion without any clear reasons. She stated she would let us know after talking to her mother.     Review of Systems:   As mentioned in subjective.    Patient Active Problem List   Diagnosis     Anxiety     Anemia associated with acute blood loss     Obesity     History of multiple concussions     PTSD (post-traumatic stress disorder)     Panic attacks     Alcohol dependence (H)     Alcoholic cirrhosis of liver (H)     Major depressive disorder, recurrent episode, moderate with anxious distress (H)     Normal spontaneous vaginal delivery     Tobacco dependence     SIRS (systemic inflammatory response syndrome) (H)       Scheduled Meds:    cefTRIAXone  1 g Intravenous Q24H     cloNIDine  0.1 mg Oral Q8H     folic acid  1 mg Oral Daily     iron sucrose  200 mg Intravenous Daily     multivitamin w/minerals  1 tablet Oral Daily     sodium chloride (PF)  3 mL Intracatheter Q8H     thiamine  100 mg Oral Daily     Continuous Infusions:  PRN Meds:.flumazenil, OLANZapine zydis **OR** haloperidol lactate, lidocaine 4%, lidocaine (buffered or not buffered), LORazepam **OR** LORazepam, melatonin, melatonin, sodium chloride (PF)    Objective:  Vital signs in last 24 hours:  Temp:  [98.2  F (36.8  C)-99  F (37.2  C)] 98.4  F (36.9  C)  Pulse:  [72-94] 72  Resp:  [16-18] 17  BP: ()/(54-71) 122/63  SpO2:  [94 %-100 %] 94 %        Intake/Output Summary (Last 24 hours) at 1/27/2022 1034  Last data filed at 1/27/2022 0623  Gross per 24 hour   Intake 1925 ml   Output --   Net 1925 ml       Physical Exam:  /63   Pulse 72   Temp 98.4  F (36.9  C) (Oral)   Resp 17   Ht 1.524 m (5')   Wt 58.1 kg (128 lb 1.4 oz)   SpO2 94%   BMI 25.02 kg/m        General: Not in obvious distress.  HEENT: NC, AT. Pale conjunctiva.   Chest: Clear to auscultation bilaterally  Heart: S1S2 normal,  regular. No M/R/G  Abdomen: Soft. NT, ND. Bowel sounds- active.  Extremities: No legs swelling  Neuro: alert and awake, grossly non-focal      Lab Results:(I have personally reviewed the results)    Recent Results (from the past 24 hour(s))   Lactic acid whole blood    Collection Time: 01/26/22 10:36 AM   Result Value Ref Range    Lactic Acid 3.4 (H) 0.7 - 2.0 mmol/L   Magnesium    Collection Time: 01/26/22 10:36 AM   Result Value Ref Range    Magnesium 1.6 (L) 1.8 - 2.6 mg/dL   Vitamin B12    Collection Time: 01/26/22 10:36 AM   Result Value Ref Range    Vitamin B12 372 213 - 816 pg/mL   Folate    Collection Time: 01/26/22 10:36 AM   Result Value Ref Range    Folic Acid 6.3 >=3.5 ng/mL   Extra Purple Top Tube    Collection Time: 01/26/22 10:36 AM   Result Value Ref Range    Hold Specimen JI    UA with Microscopic reflex to Culture    Collection Time: 01/26/22  1:39 PM    Specimen: Urine, Clean Catch   Result Value Ref Range    Color Urine Yellow Colorless, Straw, Light Yellow, Yellow    Appearance Urine Turbid (A) Clear    Glucose Urine Negative Negative mg/dL    Bilirubin Urine Negative Negative    Ketones Urine 10  (A) Negative mg/dL    Specific Gravity Urine 1.023 1.001 - 1.030    Blood Urine >1.0 mg/dL (A) Negative    pH Urine 7.0 5.0 - 7.0    Protein Albumin Urine 30  (A) Negative mg/dL    Urobilinogen Urine 2.0 (A) <2.0 mg/dL    Nitrite Urine Negative Negative    Leukocyte Esterase Urine 75 Lula/uL (A) Negative    Mucus Urine Present (A) None Seen /LPF    RBC Urine >182 (H) <=2 /HPF    WBC Urine <1 <=5 /HPF    Squamous Epithelials Urine <1 <=1 /HPF   HCG qualitative urine POCT    Collection Time: 01/26/22  2:04 PM   Result Value Ref Range    HCG Qual Urine Negative Negative    Internal QC Check POCT Valid Valid    POCT Kit Lot Number [id number]     POCT Kit Expiration Date 2023-03-31    CBC with platelets    Collection Time: 01/27/22  6:48 AM   Result Value Ref Range    WBC Count 3.9 (L) 4.0 - 11.0 10e3/uL     RBC Count 3.00 (L) 3.80 - 5.20 10e6/uL    Hemoglobin 6.4 (LL) 11.7 - 15.7 g/dL    Hematocrit 23.0 (L) 35.0 - 47.0 %    MCV 77 (L) 78 - 100 fL    MCH 21.3 (L) 26.5 - 33.0 pg    MCHC 27.8 (L) 31.5 - 36.5 g/dL    RDW 21.6 (H) 10.0 - 15.0 %    Platelet Count 174 150 - 450 10e3/uL   Comprehensive metabolic panel    Collection Time: 01/27/22  6:48 AM   Result Value Ref Range    Sodium 137 136 - 145 mmol/L    Potassium 3.6 3.5 - 5.0 mmol/L    Chloride 107 98 - 107 mmol/L    Carbon Dioxide (CO2) 22 22 - 31 mmol/L    Anion Gap 8 5 - 18 mmol/L    Urea Nitrogen 4 (L) 8 - 22 mg/dL    Creatinine 0.58 (L) 0.60 - 1.10 mg/dL    Calcium 7.9 (L) 8.5 - 10.5 mg/dL    Glucose 106 70 - 125 mg/dL    Alkaline Phosphatase 134 (H) 45 - 120 U/L    AST 90 (H) 0 - 40 U/L    ALT 23 0 - 45 U/L    Protein Total 5.9 (L) 6.0 - 8.0 g/dL    Albumin 2.6 (L) 3.5 - 5.0 g/dL    Bilirubin Total 0.4 0.0 - 1.0 mg/dL    GFR Estimate >90 >60 mL/min/1.73m2   Magnesium    Collection Time: 01/27/22  6:48 AM   Result Value Ref Range    Magnesium 1.6 (L) 1.8 - 2.6 mg/dL       All laboratory and imaging data in the past 24 hours reviewed  Serum Glucose range:   Recent Labs   Lab 01/27/22  0648 01/26/22  0155    72     ABG: No lab results found in last 7 days.  CBC:   Recent Labs   Lab 01/27/22  0648 01/26/22  0155   WBC 3.9* 3.6*   HGB 6.4* 7.3*   HCT 23.0* 26.3*   MCV 77* 77*    226   NEUTROPHIL  --  53   LYMPH  --  26   MONOCYTE  --  19   EOSINOPHIL  --  1     Chemistry:   Recent Labs   Lab 01/27/22  0648 01/26/22  1036 01/26/22  0155     --  144   POTASSIUM 3.6  --  3.8   CHLORIDE 107  --  107   CO2 22  --  21*   BUN 4*  --  8   CR 0.58*  --  0.56*   GFRESTIMATED >90  --  >90   DARA 7.9*  --  8.1*   MAG 1.6* 1.6* 1.9   PROTTOTAL 5.9*  --  7.2   ALBUMIN 2.6*  --  3.1*   AST 90*  --  73*   ALT 23  --  26   ALKPHOS 134*  --  144*   BILITOTAL 0.4  --  0.6   RUBY  --   --  28     Coags:  Recent Labs   Lab 01/26/22  0155   INR 1.07     Cardiac  Markers:  No results for input(s): CKTOTAL, TROPONINI in the last 168 hours.       XR Chest Port 1 View    Result Date: 1/26/2022  EXAM: XR CHEST PORT 1 VIEW LOCATION: [facility] DATE/TIME: 1/26/2022 3:42 AM INDICATION: fever COMPARISON: 2/17/2020     IMPRESSION: Negative chest.    CT Abdomen Pelvis w/o Contrast    Result Date: 1/26/2022  EXAM: CT ABDOMEN PELVIS W/O CONTRAST LOCATION: [facility] DATE/TIME: 1/26/2022 2:25 PM INDICATION: Abdominal pain, fever COMPARISON: CT of the chest, abdomen, and pelvis 07/27/2021 TECHNIQUE: CT scan of the abdomen and pelvis was performed without IV contrast. Multiplanar reformats were obtained. Dose reduction techniques were used. CONTRAST: None. FINDINGS: LOWER CHEST: Normal. HEPATOBILIARY: Liver is normal in size with smooth contour. Liver parenchymal attenuation is diminished consistent with steatosis. No focal hepatic parenchymal lesions. No bile duct dilatation. Calcified gallstones. PANCREAS: Normal. SPLEEN: Normal. ADRENAL GLANDS: Normal. KIDNEYS/BLADDER: Normal. BOWEL: Focal ovoid lesion associated with the fourth segment of the duodenum with peripheral rim enhancement on the comparison study is slightly smaller measuring 16 x 18 mm (series 3, image 61) previously 18 x 22 mm. No dilated bowel or bowel wall thickening. Colonic stool burden is normal. Normal appendix. LYMPH NODES: Normal. VASCULATURE: Unremarkable. PELVIC ORGANS: Normal. MUSCULOSKELETAL: Minimal anterior wedging of T12. No spondylolisthesis or vertebral compression deformity. Normal bone mineralization.     IMPRESSION: 1.  Cholelithiasis but no additional findings to suggest acute cholecystitis. 2.  Hepatic steatosis. 3.  Ovoid lesion associated with the fourth segment of the duodenum which had peripheral enhancement on the prior study is more difficult to appreciate on the current exam due to lack of IV contrast but not substantially changed. No related  bowel inflammation or mechanical obstruction.       Latest radiology report personally reviewed.    Note created using dragon voice recognition software so sounds alike errors may have escaped editing.      01/27/2022   Saleem Kapoor MD  Hospitalist, [facility]  Pager: [phone]

## 2022-01-27 NOTE — PROGRESS NOTES
"PRIMARY DIAGNOSIS: \"GENERIC\" NURSING  OUTPATIENT/OBSERVATION GOALS TO BE MET BEFORE DISCHARGE:  1. ADLs back to baseline: Yes    2. Activity and level of assistance: Standby     3. Pain status: Pain free.    4. Return to near baseline physical activity: Yes     Discharge Planner Nurse   Safe discharge environment identified: Yes  Barriers to discharge: Yes       Entered by: Shawna Oliveira 01/27/2022 6:24 AM     Please review provider order for any additional goals.   Nurse to notify provider when observation goals have been met and patient is ready for discharge.  "

## 2022-01-27 NOTE — PROGRESS NOTES
"PRIMARY DIAGNOSIS: \"GENERIC\" NURSING  OUTPATIENT/OBSERVATION GOALS TO BE MET BEFORE DISCHARGE:  1. ADLs back to baseline: Yes    2. Activity and level of assistance: Up with standby assistance.    3. Pain status: Pain free.    4. Return to near baseline physical activity: Yes     Discharge Planner Nurse   Safe discharge environment identified: Yes  Barriers to discharge: Yes       Entered by: Shawna Oliveira 01/27/2022 12:42 AM     Please review provider order for any additional goals.   Nurse to notify provider when observation goals have been met and patient is ready for discharge.  "

## 2022-01-27 NOTE — PROGRESS NOTES
"PRIMARY DIAGNOSIS: \"GENERIC\" NURSING  OUTPATIENT/OBSERVATION GOALS TO BE MET BEFORE DISCHARGE:  1. ADLs back to baseline: Yes    2. Activity and level of assistance: Up with standby assistance.    3. Pain status: Pain free.    4. Return to near baseline physical activity: Yes     Discharge Planner Nurse   Safe discharge environment identified: Yes  Barriers to discharge: Yes       Entered by: Shawna Oliveira 01/27/2022 12:41 AM     Please review provider order for any additional goals.   Nurse to notify provider when observation goals have been met and patient is ready for discharge.  "

## 2022-01-27 NOTE — CONSULTS
[facility] Consultation     Myriam Porter  [address] [place] [address]  31 year old female     Admission Date/Time: 1/26/2022  Primary Care Provider: Shantell Ruffin     We were asked to see the patient in consultation by Dr. Kapoor for evaluation of iron deficiency anemia with duodenal lesion seen on CT scan.    ASSESSMENT:    Myriam Porter is a 31 year old female with PMH of alcoholic cirrhosis with hx of portal HTN, ascites, ongoing alcohol abuse who was admitted on 1/26/22 for weakness, fever, ? ETOH Withdrawal. GI consulted for duodenal lesion seen on CT and chronic iron deficiency anemia.     1. Anemia  - Microcytic anemia, admitting Hgb 7.3 -> 6.4, MCV 77. No evidence of GI bleed besides regular menses. Iron studies consistent with iron deficiency, iron infusion discussed. Folate and B12 levels normal.   - Suspect anemia is likely due to alcohol use and marrow suppression but would question underlying gastritis/duodenitis/PUD given ongoing alcohol/tobacco use. Differential includes: AVMs, malignancy, variceal bleeds ? Although does not appear to be actively bleeding.   - Previous EGD 11/2020 negative for esophageal varices but mentioned possible eosinophilic esophagitis (no biopsies taken)    2. Alcohol abuse  3. Alcoholic liver disease  - Ongoing alcohol abuse, Blood alcohol level 205 at admission.   - Imaging study negative for significant ascites. No evidence of hepatic encephalopathy, she is alert and oriented x3.   - LFT's mildly elevated, consistent with alcohol pattern. T bili 0.4,alk phos 134, ALT 23, AST 90.     4. Duodenal lesion  - CT abd/pelvis noting ovoid lesion in duodenum. This appears slightly smaller compared to previous CT in July 2021. Differential includes: GIST, adenoma, hemangioma or carcinoid.        RECOMMENDATIONS:  - Monitor Hgb and transfuse if patient agreeable  - Agree with iron transfusion  - Will add PO pantoprazole  - Needs tobacco and  alcohol cessation    - No evidence of carl GI bleeding. Discussed EGD/EUS to assess esophagus and duodenal lesion further, patient would prefer to have these done as outpatient procedures. This is reasonable without any emergent symptoms. Would even consider a colonoscopy as well to rule out lower GI sources of bleed.     - Await blood cultures      Case discussed with Dr. Preston, please review MD addendum below.      Ron Ferrara PA-C  [facility]  [phone]  ________________________________________________________________________        CC: Weakness, abdominal pains     HPI:  Myriam Porter is a 31 year old female with PMH of alcoholic cirrhosis with hx of portal HTN, ascites, ongoing alcohol abuse, chronic anemia, depression, and anxiety who was admitted on 1/26/22 for weakness, fever, ? ETOH Withdrawal. GI consulted for duodenal lesion seen on CT and chronic iron deficiency anemia.     Pt reports feeling unwell for the past few weeks with intermittent leg cramps, dizziness, fatigue, weakness, and abdominal pains secondary to constipation. She came into the ED 2 nights ago due to progressive weakness. She had nausea and decreased appetite at admission but is currently denying nausea. She denies any recent evidence of Gi blood loss, no hematemesis, bloody stools, or melena. She has a history of alcoholic liver disease, she continues with ongoing alcohol use, ETOH level was 204 in the ED.      In the ED:  Vitals with Temp 100.5F and tachycardic. WBC 3.6, Hgb 7.3, MCV 77, platelets 226. INR 1.07. BMP with hypocalcemia (8.1), mildly low Cr at 0.56. LFTs with T bili 0.6, Alk phos 144, ALT 26, AST 73. Lactic acid 4.4 -> 3.4. Blood cultures pending. UA with leakocyte esterase positive (75 krzysztof/uL). Blood alcohol level 205.   Iron studies low, transferrin saturation 4%, iron level 16.   CT abdomen/pelvis noted ovoid lesion within the duodenum, seen on previous CT from July 2021, felt to be slightly  smaller (currently 85i03ml, previously [id number]). No evidence of paraesophageal varices (previously seen on CT from 06/2020).    Previous GI workup:  Initially seen by [facility] in 06/2020 for alcoholic liver disease complicated with ascites and portal HTN.   - CT abd/pelvis 06/2020 with hepatomegaly, steatosis, 1.5cm hypodense hepatic lesion, large ascites, mesenteric edema, anasarca, and distal paraesophageal varices. She was started on Lasix/Aldactone for a time but eventually discontinued after prescriptions completed.   - Paracentesis 07/2020 negative for SBP.   - Labs 07/2020: acute hepatitis panel normal, antimitochondrial ab normal, antismooth muscle ab normal, ceruloplasmin normal, ROSIO normal,   - EGD 11/2020 was NEGATIVE  For esophageal varices but noted esophageal mucosal furrowing and multiple rings concerning for eosinophilic esophagitis, no biopsies were taken due to concerns of potential submucosal varices.   - CT chest/abd/pelvis 07/2021 - hepatic steatosis, subtle 1.6cm lesion in central right liver, 2.3cm oval lesion in duodenum ? GIST vs adenoma, hemangioma or carcinoid.        ROS: A comprehensive ten point review of systems was negative aside from those in mentioned in the HPI.      PAST MEDICAL HISTORY:  Patient Active Problem List    Diagnosis Date Noted     SIRS (systemic inflammatory response syndrome) (H) 01/26/2022     Priority: Medium     Major depressive disorder, recurrent episode, moderate with anxious distress (H) 04/14/2021     Priority: Medium     Alcoholic cirrhosis of liver (H) 11/12/2020     Priority: Medium     Alcohol dependence (H) 07/01/2020     Priority: Medium     Tobacco dependence 07/01/2020     Priority: Medium     Anxiety 10/02/2019     Priority: Medium     PTSD (post-traumatic stress disorder) 10/02/2019     Priority: Medium     Panic attacks 10/02/2019     Priority: Medium     History of multiple concussions 12/12/2018     Priority: Medium     Anemia associated with acute  blood loss 12/05/2018     Priority: Medium     Obesity 02/16/2011     Priority: Medium     Normal spontaneous vaginal delivery 11/13/2009     Priority: Medium     SOCIAL HISTORY:  Social History     Tobacco Use     Smoking status: Current Every Day Smoker     Smokeless tobacco: None   Substance Use Topics     Alcohol use: None     Drug use: None     FAMILY HISTORY:  History reviewed. No pertinent family history.  ALLERGIES: No Known Allergies  MEDICATIONS:   Current Facility-Administered Medications   Medication     cefTRIAXone (ROCEPHIN) 1 g vial to attach to  mL bag for ADULTS or NS 50 mL bag for PEDS     cloNIDine (CATAPRES) tablet 0.1 mg     flumazenil (ROMAZICON) injection 0.2 mg     folic acid (FOLVITE) tablet 1 mg     OLANZapine zydis (zyPREXA) ODT tab 5-10 mg    Or     haloperidol lactate (HALDOL) injection 2.5-5 mg     iron sucrose (VENOFER) 200 mg in sodium chloride 0.9 % 110 mL intermittent infusion     lidocaine (LMX4) cream     lidocaine 1 % 0.1-1 mL     LORazepam (ATIVAN) tablet 1-2 mg    Or     LORazepam (ATIVAN) injection 1-2 mg     melatonin tablet 1 mg     melatonin tablet 5 mg     multivitamin w/minerals (THERA-VIT-M) tablet 1 tablet     sodium chloride (PF) 0.9% PF flush 3 mL     sodium chloride (PF) 0.9% PF flush 3 mL     thiamine (B-1) tablet 100 mg       PHYSICAL EXAM:   /55   Pulse 73   Temp 98.4  F (36.9  C) (Oral)   Resp 17   Ht 1.524 m (5')   Wt 58.1 kg (128 lb 1.4 oz)   SpO2 94%   BMI 25.02 kg/m     GEN: Alert, oriented x3, communicative and in NAD.  YOLIS: AT, anicteric, OP without erythema, exudate, or ulcers.    NECK: Supple.    LYMPH: No LAD noted.  HRT: RRR  LUNGS: CTA  ABD: ND, +BS, no guarding or pain to palpation, no rebound, no HSM.  SKIN: No rash, jaundice or spider angiomata  MSKL: LE free of edema, strength 5/5 all 4 extrems  NEURO: CN grossly intact, sensation intact to light touch, toes downgoing.     ADDITIONAL DATA:   I reviewed the patient's new clinical  "lab test results.   Recent Labs   Lab Test 01/27/22  0648 01/26/22  0155 06/18/20  0053   WBC 3.9* 3.6* 6.7   HGB 6.4* 7.3* 7.3*   MCV 77* 77* 95    226 226   INR  --  1.07 1.29*     Recent Labs   Lab Test 01/27/22  0648 01/26/22  0155 06/18/20  0053   POTASSIUM 3.6 3.8 3.6   CHLORIDE 107 107 107   CO2 22 21* 22   BUN 4* 8 4*   ANIONGAP 8 16 13     Recent Labs   Lab Test 01/27/22  0648 01/26/22  1339 01/26/22  0155 06/18/20  0053 06/18/20  0035 10/22/19  2255   ALBUMIN 2.6*  --  3.1* 1.5*  --   --    BILITOTAL 0.4  --  0.6 1.5*  --   --    ALT 23  --  26 35  --   --    AST 90*  --  73* 200*  --   --    PROTEIN  --  30 *  --   --  100 mg/dL* Trace*   LIPASE  --   --  72* 131*  --   --         IMAGING:  I reviewed the patient's new imaging results.            Total time spent on patient: 50 minutes       GI Staff Addendum  DOS 1/27/2022     31 year old yo F with hx of EtOH abuse, presents with weakness, nausea. Abdominal pain. EtOH 203. Reports drinking \"three 10% beers daily.\" No previous EtOH treatment. Complaints of constipation; passing pellets. Pt denies diarrhea, hematochezia, melena. Typically passing 1 BM every 3 days. Denies improvement in abdominal pain with passage of stool. No FHx of colon cancer. Pt reports heavy menses. Denies NSAIDs.  Abdominal pain resolved since admission. IV iron administered.    EGD 2020 NO esophageal varices. Small hiatal hernia. Otherwise normal stomach, duodenum.    /89 (BP Location: Left arm)   Pulse 72   Temp 99  F (37.2  C) (Oral)   Resp 16   Ht 1.524 m (5')   Wt 58.1 kg (128 lb 1.4 oz)   SpO2 100%   BMI 25.02 kg/m    General: A&O, NAD, non-toxic appearing  Eyes: No icterus or conjunctivitis  ENT: MMM, OP clear without ulcerations  Neck/Thyroid: Supple, no masses  Pulmonary: CTA B  Cardiovascular: RR, S1, S2  Gastrointestinal: Soft, NTTP, NABS, no r/g, no masses  Skin: No jaundice/petechiae/rashes  Lymph nodes: No cervical or supraclavicular " lymphadenopathy  Extrem: PPI, no c/c/e      LABORATORY    ELECTROLYTE PANEL   Recent Labs   Lab 01/27/22  0648 01/26/22  0155    144   POTASSIUM 3.6 3.8   CHLORIDE 107 107   CO2 22 21*    72   CR 0.58* 0.56*   BUN 4* 8      HEMATOLOGY PANEL   Recent Labs   Lab 01/27/22  1232 01/27/22  0648 01/26/22  0155   HGB 6.5* 6.4* 7.3*   MCV 78 77* 77*   WBC 3.6* 3.9* 3.6*    174 226   INR  --   --  1.07      LIVER AND PANCREAS PANEL   Recent Labs   Lab 01/27/22  0648 01/26/22  0155   AST 90* 73*   ALT 23 26   ALKPHOS 134* 144*   BILITOTAL 0.4 0.6   LIPASE  --  72*     Folate 6.3 (low nml,) Vit B12 372  Iron 4    TSH nml  Bld cx NGTD  EtOH 203      IMAGING STUDIES    CT Abdomen Pelvis w/o Contrast    Result Date: 1/26/2022  CONTRAST: None. FINDINGS: LOWER CHEST: Normal. HEPATOBILIARY: Liver is normal in size with smooth contour. Liver parenchymal attenuation is diminished consistent with steatosis. No focal hepatic parenchymal lesions. No bile duct dilatation. Calcified gallstones. PANCREAS: Normal. SPLEEN: Normal. ADRENAL GLANDS: Normal. KIDNEYS/BLADDER: Normal. BOWEL: Focal ovoid lesion associated with the fourth segment of the duodenum with peripheral rim enhancement on the comparison study is slightly smaller measuring 16 x 18 mm (series 3, image 61) previously 18 x 22 mm. No dilated bowel or bowel wall thickening. Colonic stool burden is normal. Normal appendix. LYMPH NODES: Normal. VASCULATURE: Unremarkable. PELVIC ORGANS: Normal. MUSCULOSKELETAL: Minimal anterior wedging of T12. No spondylolisthesis or vertebral compression deformity. Normal bone mineralization.     IMPRESSION: 1.  Cholelithiasis but no additional findings to suggest acute cholecystitis. 2.  Hepatic steatosis. 3.  Ovoid lesion associated with the fourth segment of the duodenum which had peripheral enhancement on the prior study is more difficult to appreciate on the current exam due to lack of IV contrast but not substantially  changed. No related bowel inflammation or mechanical obstruction.     I have reviewed the current diagnostic and laboratory tests.                Assessment:  1. Iron deficiency anemia - Multifactorial. Menorrhagia contributing. Poor intake and chronic EtoH use also contributing.  2. Abnormal duodenal imaging - ?polyp vs submucosal nodule.   3. EtOH abuse - Concern for chronic liver disease, fibrosis. Past imaging with portal HTN changes. Acutely intoxicated on admission. Mild alcoholic hepatitis.  4. Abdominal pain - Resolved. Acute intoxication likely contributing.  5. Consitpation    Patient Active Problem List   Diagnosis     Anxiety     Anemia associated with acute blood loss     Obesity     History of multiple concussions     PTSD (post-traumatic stress disorder)     Panic attacks     Alcohol dependence (H)     Alcoholic cirrhosis of liver (H)     Major depressive disorder, recurrent episode, moderate with anxious distress (H)     Normal spontaneous vaginal delivery     Tobacco dependence     SIRS (systemic inflammatory response syndrome) (H)       Plan:  -Importance of EtOH abstinence reiterated.  -Will coordinate outpt EGD (possible EUS) + colonoscopy to evaluate duodenal polyp/nodule, iron deficiency anemia.  -Check celiac disease panel.  -Consider outpt evaluation of menorrhagia.  -Drink 64 oz water daily. Miralax as needed.  -Hepatology clinic evaluation.    Approximately 20 minutes of total time was spent providing patient care including patient evaluation, reviewing documentation/test results, and .                                                  Virgil Preston MD  Thank you for the opportunity to participate in the care of this patient.   Please feel free to call me with any questions or concerns.  Phone number [phone].

## 2022-01-27 NOTE — UTILIZATION REVIEW
Concurrent stay review; Secondary Review Determination       Under the authority of the Utilization Management Committee, the utilization review process indicated a secondary review on the above patient.  The review outcome is based on review of the medical records, discussions with staff, and applying clinical experience noted on the date of the review.          (x) Observation Status Appropriate - Concurrent stay review    RATIONALE FOR DETERMINATION     Ms. Porter is a 30 yo female with a PMH of alcohol abuse, liver cirrhosis, chronic anemia and PTSD who presents to the ED with weakness.  Noted to be acutely inebriated.  She is anemic and iron deficient, receiving IV iron today.  No PRBCs planned.  GI consulted; recommending outpt endoscopic evaluations.  She is off IVF; remains on IV abx for possible UTI.  Vitals are stable.  Likely will be able to discharge home later today or in the am.     Patient is clinically improving and there is no clear indication to change patient's status to inpatient. The severity of illness, intensity of service provided, expected LOS and risk for adverse outcome make the care appropriate for observation.      The information on this document is developed by the utilization review team in order for the business office to ensure compliance.  This only denotes the appropriateness of proper admission status and does not reflect the quality of care rendered.         The definitions of Inpatient Status and Observation Status used in making the determination above are those provided in the CMS Coverage Manual, Chapter 1 and Chapter 6, section 70.4.          Sincerely,       Sunita Ponce, DO  Utilization Review  Physician Advisor  [facility].

## 2022-01-28 ENCOUNTER — APPOINTMENT (OUTPATIENT)
Dept: RADIOLOGY | Facility: HOSPITAL | Age: 32
End: 2022-01-28
Attending: INTERNAL MEDICINE
Payer: COMMERCIAL

## 2022-01-28 LAB
ABO/RH(D): NORMAL
ANTIBODY SCREEN: NEGATIVE
BLD PROD TYP BPU: NORMAL
BLOOD COMPONENT TYPE: NORMAL
CODING SYSTEM: NORMAL
CROSSMATCH: NORMAL
ERYTHROCYTE [DISTWIDTH] IN BLOOD BY AUTOMATED COUNT: 22 % (ref 10–15)
HCT VFR BLD AUTO: 24.4 % (ref 35–47)
HGB BLD-MCNC: 6.6 G/DL (ref 11.7–15.7)
IGA SERPL-MCNC: 494 MG/DL (ref 84–499)
ISSUE DATE AND TIME: NORMAL
MAGNESIUM SERPL-MCNC: 1.4 MG/DL (ref 1.8–2.6)
MCH RBC QN AUTO: 21.5 PG (ref 26.5–33)
MCHC RBC AUTO-ENTMCNC: 27 G/DL (ref 31.5–36.5)
MCV RBC AUTO: 80 FL (ref 78–100)
PLATELET # BLD AUTO: 181 10E3/UL (ref 150–450)
RBC # BLD AUTO: 3.07 10E6/UL (ref 3.8–5.2)
SPECIMEN EXPIRATION DATE: NORMAL
TTG IGA SER-ACNC: 1.4 U/ML
TTG IGG SER-ACNC: <0.6 U/ML
UNIT ABO/RH: NORMAL
UNIT NUMBER: NORMAL
UNIT STATUS: NORMAL
UNIT TYPE ISBT: 5100
WBC # BLD AUTO: 5.2 10E3/UL (ref 4–11)

## 2022-01-28 PROCEDURE — 99217 PR OBSERVATION CARE DISCHARGE: CPT | Performed by: INTERNAL MEDICINE

## 2022-01-28 PROCEDURE — 96375 TX/PRO/DX INJ NEW DRUG ADDON: CPT

## 2022-01-28 PROCEDURE — 86901 BLOOD TYPING SEROLOGIC RH(D): CPT | Performed by: INTERNAL MEDICINE

## 2022-01-28 PROCEDURE — 250N000013 HC RX MED GY IP 250 OP 250 PS 637: Performed by: PHYSICIAN ASSISTANT

## 2022-01-28 PROCEDURE — 36430 TRANSFUSION BLD/BLD COMPNT: CPT

## 2022-01-28 PROCEDURE — 250N000013 HC RX MED GY IP 250 OP 250 PS 637: Performed by: INTERNAL MEDICINE

## 2022-01-28 PROCEDURE — 96376 TX/PRO/DX INJ SAME DRUG ADON: CPT

## 2022-01-28 PROCEDURE — 258N000003 HC RX IP 258 OP 636: Performed by: INTERNAL MEDICINE

## 2022-01-28 PROCEDURE — 86923 COMPATIBILITY TEST ELECTRIC: CPT | Performed by: INTERNAL MEDICINE

## 2022-01-28 PROCEDURE — G0378 HOSPITAL OBSERVATION PER HR: HCPCS

## 2022-01-28 PROCEDURE — 83735 ASSAY OF MAGNESIUM: CPT | Performed by: INTERNAL MEDICINE

## 2022-01-28 PROCEDURE — 85027 COMPLETE CBC AUTOMATED: CPT | Performed by: INTERNAL MEDICINE

## 2022-01-28 PROCEDURE — 72100 X-RAY EXAM L-S SPINE 2/3 VWS: CPT

## 2022-01-28 PROCEDURE — P9016 RBC LEUKOCYTES REDUCED: HCPCS | Performed by: INTERNAL MEDICINE

## 2022-01-28 PROCEDURE — 36415 COLL VENOUS BLD VENIPUNCTURE: CPT | Performed by: INTERNAL MEDICINE

## 2022-01-28 PROCEDURE — 250N000011 HC RX IP 250 OP 636: Performed by: INTERNAL MEDICINE

## 2022-01-28 RX ORDER — FERROUS GLUCONATE 324(38)MG
324 TABLET ORAL
Qty: 30 TABLET | Refills: 0 | Status: SHIPPED | OUTPATIENT
Start: 2022-01-28 | End: 2022-04-28

## 2022-01-28 RX ORDER — PANTOPRAZOLE SODIUM 40 MG/1
40 TABLET, DELAYED RELEASE ORAL
Qty: 30 TABLET | Refills: 0 | Status: SHIPPED | OUTPATIENT
Start: 2022-01-29 | End: 2022-04-28

## 2022-01-28 RX ORDER — LANOLIN ALCOHOL/MO/W.PET/CERES
100 CREAM (GRAM) TOPICAL DAILY
Qty: 30 TABLET | Refills: 0 | Status: SHIPPED | OUTPATIENT
Start: 2022-01-29 | End: 2022-04-28

## 2022-01-28 RX ORDER — FOLIC ACID 1 MG/1
1 TABLET ORAL DAILY
Qty: 30 TABLET | Refills: 0 | Status: SHIPPED | OUTPATIENT
Start: 2022-01-29 | End: 2022-04-28

## 2022-01-28 RX ORDER — MAGNESIUM SULFATE HEPTAHYDRATE 40 MG/ML
2 INJECTION, SOLUTION INTRAVENOUS ONCE
Status: COMPLETED | OUTPATIENT
Start: 2022-01-28 | End: 2022-01-28

## 2022-01-28 RX ORDER — BACLOFEN 5 MG/1
5 TABLET ORAL 3 TIMES DAILY PRN
Status: DISCONTINUED | OUTPATIENT
Start: 2022-01-28 | End: 2022-01-29 | Stop reason: HOSPADM

## 2022-01-28 RX ADMIN — Medication 100 MG: at 09:27

## 2022-01-28 RX ADMIN — MAGNESIUM SULFATE HEPTAHYDRATE 2 G: 40 INJECTION, SOLUTION INTRAVENOUS at 09:26

## 2022-01-28 RX ADMIN — PANTOPRAZOLE SODIUM 40 MG: 40 TABLET, DELAYED RELEASE ORAL at 06:17

## 2022-01-28 RX ADMIN — FOLIC ACID 1 MG: 1 TABLET ORAL at 09:26

## 2022-01-28 RX ADMIN — BACLOFEN 5 MG: 5 TABLET ORAL at 21:25

## 2022-01-28 RX ADMIN — BACLOFEN 5 MG: 5 TABLET ORAL at 16:15

## 2022-01-28 RX ADMIN — CEFTRIAXONE SODIUM 1 G: 1 INJECTION, POWDER, FOR SOLUTION INTRAMUSCULAR; INTRAVENOUS at 06:17

## 2022-01-28 RX ADMIN — Medication 1 TABLET: at 09:27

## 2022-01-28 RX ADMIN — CLONIDINE HYDROCHLORIDE 0.1 MG: 0.1 TABLET ORAL at 17:47

## 2022-01-28 RX ADMIN — IRON SUCROSE 200 MG: 20 INJECTION, SOLUTION INTRAVENOUS at 11:24

## 2022-01-28 RX ADMIN — CLONIDINE HYDROCHLORIDE 0.1 MG: 0.1 TABLET ORAL at 10:51

## 2022-01-28 NOTE — PLAN OF CARE
Problem: Adult Inpatient Plan of Care  Goal: Plan of Care Review  Outcome: No Change   Receiving 1 unit of blood. C.I.W.A. monitoring  Problem: Adult Inpatient Plan of Care  Goal: Optimal Comfort and Wellbeing  Outcome: No Change   C/o of a low back pain. Ambulating to bathroom indep. Text to doctor for heat pad per pt. Request. Very good oral intake.

## 2022-01-28 NOTE — PROGRESS NOTES
"PRIMARY DIAGNOSIS: \"GENERIC\" NURSING  OUTPATIENT/OBSERVATION GOALS TO BE MET BEFORE DISCHARGE:  1. ADLs back to baseline: Yes    2. Activity and level of assistance: Ambulating independently.    3. Pain status: Pain free.    4. Return to near baseline physical activity: Yes     Discharge Planner Nurse   Safe discharge environment identified: Yes  Barriers to discharge: No       Entered by: Shawna Oliveira 01/28/2022 3:44 AM     Please review provider order for any additional goals.   Nurse to notify provider when observation goals have been met and patient is ready for discharge.  "

## 2022-01-28 NOTE — PROGRESS NOTES
"PRIMARY DIAGNOSIS: \"GENERIC\" NURSING  OUTPATIENT/OBSERVATION GOALS TO BE MET BEFORE DISCHARGE:  1. ADLs back to baseline: Yes    2. Activity and level of assistance: Ambulating independently.    3. Pain status: Pain free.    4. Return to near baseline physical activity: Yes     Discharge Planner Nurse   Safe discharge environment identified: Yes  Barriers to discharge: No       Entered by: Shawna Oliveira 01/28/2022 3:43 AM     Please review provider order for any additional goals.   Nurse to notify provider when observation goals have been met and patient is ready for discharge.  "

## 2022-01-28 NOTE — CONSULTS
HEMAL met with pt and pt's mom. Introduced self and role. Pt appeared in a lot of pain, pt's mom asked most of the questions regarding chemical dependency resources.   Pt's mom asked appropriate questions and is very hopeful that pt will go to inpatient treatment. SW provided resources on rule 25 assessments, treatment facilities and group meetings in the area. SW left contact info incase any questions arise.         Mechelle Orantes, TRISHASW

## 2022-01-28 NOTE — PROGRESS NOTES
"PRIMARY DIAGNOSIS: \"GENERIC\" NURSING  OUTPATIENT/OBSERVATION GOALS TO BE MET BEFORE DISCHARGE:  1. ADLs back to baseline: Yes    2. Activity and level of assistance: Ambulating independently.    3. Pain status: Pain free.    4. Return to near baseline physical activity: Yes     Discharge Planner Nurse   Safe discharge environment identified: Yes  Barriers to discharge: No       Entered by: Shawna Oliveira 01/28/2022 4:10 AM     Please review provider order for any additional goals.   Nurse to notify provider when observation goals have been met and patient is ready for discharge.  "

## 2022-01-28 NOTE — PROGRESS NOTES
[facility] Progress Note     IMPRESSION:  Myriam Porter is a 31 year old female with PMH of alcoholic cirrhosis with hx of portal HTN, ascites, ongoing alcohol abuse who was admitted on 1/26/22 for weakness, fever, ? ETOH Withdrawal. GI consulted for duodenal lesion seen on CT and chronic iron deficiency anemia.      1. Anemia  - Microcytic anemia, admitting Hgb 7.3 -> 6.4, MCV 77. No evidence of GI bleed besides regular menses. Iron studies consistent with iron deficiency, iron infusion discussed. Folate and B12 levels normal.   - Suspect anemia is likely due to alcohol use and marrow suppression but would question underlying gastritis/duodenitis/PUD given ongoing alcohol/tobacco use. Differential includes: AVMs, malignancy, variceal bleeds ? Although does not appear to be actively bleeding.   - Previous EGD 11/2020 negative for esophageal varices but mentioned possible eosinophilic esophagitis (no biopsies taken)     2. Alcohol abuse  3. Alcoholic liver disease  - Ongoing alcohol abuse, Blood alcohol level 205 at admission.   - Imaging study negative for significant ascites. No evidence of hepatic encephalopathy, she is alert and oriented x3.   - LFT's mildly elevated, consistent with alcohol pattern. T bili 0.4,alk phos 134, ALT 23, AST 90.      4. Duodenal lesion  - CT abd/pelvis noting ovoid lesion in duodenum. This appears slightly smaller compared to previous CT in July 2021. Differential includes: GIST, adenoma, hemangioma or carcinoid.      RECOMMENDATIONS:  - Would recommend transfusion if possible.   - Continue PPI therapy  - Alcohol cessation  - Discussed with Dr. Preston and advanced endoscopist, recommending outpatient EGD (with peds scope)/Colonoscopy to assess further, possible EUS if unrevealing. [facility] will help coordinate.  - Hepatology followup     - Disposition: If she continues to remain stable, I suspect she should be ok for discharge soon. Continue with electrolyte correction  and transfusion if patient agreeable.       GI will sign off at this time. Please call if questions arise or the patient's status changes. Thank you for consulting us on this pleasant patient.          Ron Ferrara PA-C  [facility]  [phone]  ________________________________________________________________________      SUBJECTIVE:    Complains of left arm pain due to receiving IV magnesium. No erythema noted.  Had a hard time talking to her as she was mainly focused on her arm pains.  No evidence of bleeding  Tolerating diet     OBJECTIVE:  /58 (BP Location: Left arm, Patient Position: Semi-Bruno's)   Pulse 89   Temp 98.6  F (37  C) (Oral)   Resp 16   Ht 1.524 m (5')   Wt 58.1 kg (128 lb 1.4 oz)   SpO2 99%   BMI 25.02 kg/m    Temp (24hrs), Av.7  F (37.1  C), Min:98.3  F (36.8  C), Max:99  F (37.2  C)    Patient Vitals for the past 72 hrs:   Weight   22 1538 58.1 kg (128 lb 1.4 oz)   22 2342 57.6 kg (127 lb)       Intake/Output Summary (Last 24 hours) at 2022 1000  Last data filed at 2022 2200  Gross per 24 hour   Intake 300 ml   Output --   Net 300 ml        PHYSICAL EXAM  GEN: Alert, oriented x3, communicative and in NAD.    LYMPH: No LAD noted.  HRT: RRR  LUNGS: CTA  ABD: ND, +BS, no guarding or pain to palpation, no rebound, no HSM.  SKIN: No rash, jaundice or spider angiomata      LABS:  I have reviewed the patient's new clinical lab results:     Recent Labs   Lab Test 22  0655 22  1232 22  0648 22  0155 20  0053 20  0053   WBC 5.2 3.6* 3.9* 3.6*   < > 6.7   HGB 6.6* 6.5* 6.4* 7.3*  --  7.3*   MCV 80 78 77* 77*  --  95    160 174 226  --  226   INR  --   --   --  1.07  --  1.29*    < > = values in this interval not displayed.     Recent Labs   Lab Test 22  0648 22  0155 20  0053   POTASSIUM 3.6 3.8 3.6   CHLORIDE 107 107 107   CO2 22 21* 22   BUN 4* 8 4*   ANIONGAP 8 16 13     Recent Labs   Lab  Test 01/27/22  0648 01/26/22  1339 01/26/22  0155 06/18/20  0053 06/18/20  0035 10/22/19  2255   ALBUMIN 2.6*  --  3.1* 1.5*  --   --    BILITOTAL 0.4  --  0.6 1.5*  --   --    ALT 23  --  26 35  --   --    AST 90*  --  73* 200*  --   --    PROTEIN  --  30 *  --   --  100 mg/dL* Trace*   LIPASE  --   --  72* 131*  --   --        IMAGING:  Reviewed    Total time spent on patient: 25 minutes

## 2022-01-28 NOTE — PLAN OF CARE
Denies pain/nausea. LS clear. BS active.     Scoring on the CIWA protocol for tremors, mild anxiety, and tactile disturbances.     Mg protocol with recheck in the morning.     Call light within reach. Able to make needs known. Up independently.

## 2022-01-28 NOTE — SIGNIFICANT EVENT
Significant Event Note    Time of event: 3:06 PM January 28, 2022    Description of event:  Patient was supposed to go home today after blood transfusion. I was paged to talk to patient's mother as she is now in the room. Patient's mother stated she is not ready to come home. Patient was doing well this morning, and was feeling ready to go home. She is now reporting severe back pain. She thinks she is getting muscle spasms. Vitals are stable. No leg weakness. I do not think this is transfusion reaction. Will order baclofen prn for muscle spasms, and get xray Lumbar spine. Patient's mother also requesting to talk with a  regarding her alcohol abuse. Discharge cancelled for today.     Discussed with: bedside nurse and social service    RENITA Diaz

## 2022-01-28 NOTE — DISCHARGE SUMMARY
[facility] MEDICINE  DISCHARGE SUMMARY     Primary Care Physician: Shantell Ruffin  Admission Date: 1/26/2022   Discharge Provider: RENITA Diaz Discharge Date: 1/28/2022   Diet: Regular   Code Status: Full Code   Activity: DCACTIVITY: Activity as tolerated        Condition at Discharge: Stable     REASON FOR PRESENTATION(See Admission Note for Details)   Weakness    PRINCIPAL & ACTIVE DISCHARGE DIAGNOSES     Active Problems:    SIRS (systemic inflammatory response syndrome) (H)    Alcohol intoxication    Alcohol withdrawal without complications    Alcohol related liver injury    Hypomagnesemia    Microcytic anemia    PENDING LABS     Unresulted Labs Ordered in the Past 30 Days of this Admission     Date and Time Order Name Status Description    1/28/2022  8:45 AM Prepare red blood cells (unit) Preliminary     1/27/2022  6:58 PM Tissue transglutaminase chela IgA and IgG In process     1/26/2022  3:15 AM Blood Culture Peripheral Blood Preliminary             PROCEDURES ( this hospitalization only)          RECOMMENDATIONS TO OUTPATIENT PROVIDER FOR F/U VISIT     Follow-up Appointments     Follow-up and recommended labs and tests       Follow up with primary care provider, Shantell Ruffin, within 7 days for   hospital follow- up.                 DISPOSITION     Home    SUMMARY OF HOSPITAL COURSE:      31 year old female with PMH of alcohol abuse, liver cirrhosis, chronic anemia, PTSD who presents to our ED for evaluation of weakness, tremors and abdominal pain:     Generalized weakness  Anemia  -- Anemia is microcytic with MCV of 77. B12 was normal on 3/10/21  -- Hgb of 7.3 noted on admission. Dropped down to 6.4. This was likely due to dilution. No active bleeding besides her regular menses. Discussed about blood transfusion on 1/27. Somehow she was very hesitant about blood transfusion. She stated she would let us know if she wants transfusion after talking to her  mother. Agreed with blood transfusion today. Transfused an unit of pRBCs  -- Checked iron studies- deficient in iron with serum iron level of 16, and transferrin saturation at 4%. Received 2 doses of IV venofer 200mg each. Started on oral iron therapy upon discharge  -- TSH, B12 and folate level- normal.  -- CT abd/pelvis done on admission showed an ovoid lesion associated with the 4th segment of the duodenum. GI consulted. GI recommended outpatient EGD (with peds scope)/colonoscopy, possible EUS if unrevealing. MN will help co-ordinate.      Alcohol abuse  -- BAL noted on admission at 205  -- Advised to quit.  consulted  -- Treated per [facility] protocol. Not scoring high. Patient feels ready to go home.      Alcohol induced liver disease  -- Abnormal LFTs noted  -- Follow up with GI recommended     Lactic acidosis  Fever  -- Patient had a fever of 100.5F in ED.   -- Source of infection is unclear at the moment. Chest xray- negative. UA noted. Microscopic hematuria is likely due to menstruaion. Received 3 days of IV rocephin  -- Checked procal- negative.     Hypomagnesemia:  -- Likely associated with alcohol abuse  -- Replaced    Discharge Medications with Med changes:     Current Discharge Medication List      START taking these medications    Details   ferrous gluconate (FERGON) 324 (38 Fe) MG tablet Take 1 tablet (324 mg) by mouth daily (with breakfast)  Qty: 30 tablet, Refills: 0    Associated Diagnoses: Anemia associated with acute blood loss      folic acid (FOLVITE) 1 MG tablet Take 1 tablet (1 mg) by mouth daily  Qty: 30 tablet, Refills: 0    Associated Diagnoses: Alcohol dependence with uncomplicated intoxication (H)      pantoprazole (PROTONIX) 40 MG EC tablet Take 1 tablet (40 mg) by mouth every morning (before breakfast)  Qty: 30 tablet, Refills: 0    Associated Diagnoses: Anemia associated with acute blood loss      thiamine (B-1) 100 MG tablet Take 1 tablet (100 mg) by mouth daily  Qty: 30  tablet, Refills: 0    Associated Diagnoses: Alcohol dependence with uncomplicated intoxication (H)                   Rationale for medication changes:      Please see the summary above        Consults   GI      Immunizations given this encounter       There is no immunization history on file for this patient.        Anticoagulation Information      Recent INR results:   Recent Labs   Lab 01/26/22  0155   INR 1.07     Warfarin doses (if applicable) or name of other anticoagulant: NA      SIGNIFICANT IMAGING FINDINGS     Results for orders placed or performed during the hospital encounter of 01/26/22   XR Chest Port 1 View    Impression    IMPRESSION: Negative chest.   CT Abdomen Pelvis w/o Contrast    Impression    IMPRESSION:     1.  Cholelithiasis but no additional findings to suggest acute cholecystitis.  2.  Hepatic steatosis.  3.  Ovoid lesion associated with the fourth segment of the duodenum which had peripheral enhancement on the prior study is more difficult to appreciate on the current exam due to lack of IV contrast but not substantially changed. No related bowel   inflammation or mechanical obstruction.         SIGNIFICANT LABORATORY FINDINGS     Most Recent 3 CBC's:Recent Labs   Lab Test 01/28/22  0655 01/27/22  1232 01/27/22  0648   WBC 5.2 3.6* 3.9*   HGB 6.6* 6.5* 6.4*   MCV 80 78 77*    160 174     Most Recent 3 BMP's:Recent Labs   Lab Test 01/27/22  0648 01/26/22  0155 06/18/20  0053    144 142   POTASSIUM 3.6 3.8 3.6   CHLORIDE 107 107 107   CO2 22 21* 22   BUN 4* 8 4*   CR 0.58* 0.56* 0.64   ANIONGAP 8 16 13   DARA 7.9* 8.1* 7.1*    72 86         Discharge Orders        Reason for your hospital stay    Alcohol intoxication, weakness.     Follow-up and recommended labs and tests     Follow up with primary care provider, Shantell Ruffin, within 7 days for hospital follow- up.     Activity    Your activity upon discharge: activity as tolerated     Diet    Follow this diet upon  discharge: Orders Placed This Encounter      Combination Diet Regular Diet Adult       Examination   /82   Pulse 69   Temp 98.6  F (37  C) (Oral)   Resp 18   Ht 1.524 m (5')   Wt 58.1 kg (128 lb 1.4 oz)   SpO2 97%   BMI 25.02 kg/m        General: Not in obvious distress.  HEENT: NC, AT   Chest: Clear to auscultation bilaterally  Heart: S1S2 normal, regular. No M/R/G  Abdomen: Soft. NT, ND. Bowel sounds- active.  Extremities: No legs swelling  Neuro: Alert and awake, grossly non-focal      Please see EMR for more detailed significant labs, imaging, consultant notes etc.    I, RENITA Diaz, personally saw the patient today and spent greater than 30 minutes discharging this patient.    RENITA Diaz  [facility]    CC:Shantell Ruffin

## 2022-01-29 VITALS
SYSTOLIC BLOOD PRESSURE: 110 MMHG | DIASTOLIC BLOOD PRESSURE: 68 MMHG | HEIGHT: 60 IN | OXYGEN SATURATION: 99 % | BODY MASS INDEX: 25.15 KG/M2 | TEMPERATURE: 97.7 F | RESPIRATION RATE: 16 BRPM | HEART RATE: 92 BPM | WEIGHT: 128.09 LBS

## 2022-01-29 LAB
ALBUMIN SERPL-MCNC: 2.5 G/DL (ref 3.5–5)
ALP SERPL-CCNC: 143 U/L (ref 45–120)
ALT SERPL W P-5'-P-CCNC: 20 U/L (ref 0–45)
ANION GAP SERPL CALCULATED.3IONS-SCNC: 8 MMOL/L (ref 5–18)
AST SERPL W P-5'-P-CCNC: 48 U/L (ref 0–40)
BILIRUB SERPL-MCNC: 0.4 MG/DL (ref 0–1)
BUN SERPL-MCNC: 2 MG/DL (ref 8–22)
CALCIUM SERPL-MCNC: 8.1 MG/DL (ref 8.5–10.5)
CHLORIDE BLD-SCNC: 109 MMOL/L (ref 98–107)
CO2 SERPL-SCNC: 21 MMOL/L (ref 22–31)
CREAT SERPL-MCNC: 0.51 MG/DL (ref 0.6–1.1)
ERYTHROCYTE [DISTWIDTH] IN BLOOD BY AUTOMATED COUNT: 23.4 % (ref 10–15)
GFR SERPL CREATININE-BSD FRML MDRD: >90 ML/MIN/1.73M2
GLUCOSE BLD-MCNC: 101 MG/DL (ref 70–125)
HCT VFR BLD AUTO: 29.5 % (ref 35–47)
HGB BLD-MCNC: 8.3 G/DL (ref 11.7–15.7)
MAGNESIUM SERPL-MCNC: 1.7 MG/DL (ref 1.8–2.6)
MCH RBC QN AUTO: 23.2 PG (ref 26.5–33)
MCHC RBC AUTO-ENTMCNC: 28.1 G/DL (ref 31.5–36.5)
MCV RBC AUTO: 83 FL (ref 78–100)
PLATELET # BLD AUTO: 185 10E3/UL (ref 150–450)
POTASSIUM BLD-SCNC: 3.5 MMOL/L (ref 3.5–5)
PROT SERPL-MCNC: 6.2 G/DL (ref 6–8)
RBC # BLD AUTO: 3.57 10E6/UL (ref 3.8–5.2)
SODIUM SERPL-SCNC: 138 MMOL/L (ref 136–145)
WBC # BLD AUTO: 7.4 10E3/UL (ref 4–11)

## 2022-01-29 PROCEDURE — G0378 HOSPITAL OBSERVATION PER HR: HCPCS

## 2022-01-29 PROCEDURE — 250N000013 HC RX MED GY IP 250 OP 250 PS 637: Performed by: INTERNAL MEDICINE

## 2022-01-29 PROCEDURE — 96376 TX/PRO/DX INJ SAME DRUG ADON: CPT

## 2022-01-29 PROCEDURE — 250N000013 HC RX MED GY IP 250 OP 250 PS 637: Performed by: PHYSICIAN ASSISTANT

## 2022-01-29 PROCEDURE — 36415 COLL VENOUS BLD VENIPUNCTURE: CPT | Performed by: INTERNAL MEDICINE

## 2022-01-29 PROCEDURE — 83735 ASSAY OF MAGNESIUM: CPT | Performed by: INTERNAL MEDICINE

## 2022-01-29 PROCEDURE — 258N000003 HC RX IP 258 OP 636: Performed by: INTERNAL MEDICINE

## 2022-01-29 PROCEDURE — 80053 COMPREHEN METABOLIC PANEL: CPT | Performed by: INTERNAL MEDICINE

## 2022-01-29 PROCEDURE — 250N000011 HC RX IP 250 OP 636: Performed by: INTERNAL MEDICINE

## 2022-01-29 PROCEDURE — 85027 COMPLETE CBC AUTOMATED: CPT | Performed by: INTERNAL MEDICINE

## 2022-01-29 RX ADMIN — CLONIDINE HYDROCHLORIDE 0.1 MG: 0.1 TABLET ORAL at 01:49

## 2022-01-29 RX ADMIN — CEFTRIAXONE SODIUM 1 G: 1 INJECTION, POWDER, FOR SOLUTION INTRAMUSCULAR; INTRAVENOUS at 05:19

## 2022-01-29 RX ADMIN — CLONIDINE HYDROCHLORIDE 0.1 MG: 0.1 TABLET ORAL at 10:40

## 2022-01-29 RX ADMIN — PANTOPRAZOLE SODIUM 40 MG: 40 TABLET, DELAYED RELEASE ORAL at 10:40

## 2022-01-29 RX ADMIN — FOLIC ACID 1 MG: 1 TABLET ORAL at 10:40

## 2022-01-29 RX ADMIN — Medication 100 MG: at 10:40

## 2022-01-29 RX ADMIN — Medication 1 TABLET: at 10:40

## 2022-01-29 NOTE — PROGRESS NOTES
SW met with patient in her room, pt has discharge orders. Pt lives with her mother, brother and 12 year old son.   Patient states she plans to follow up on scheduling a Rule 25 Assessment.   This SW provided pt with list of resources for Rule 25 Assessments- including [place] services, [facility] and [place]. Also provided additional resources for [facility], [facility] residential [facility]. Patient states understanding of this, states she also has an appointment with [facility] next week.  SW provided pt with [facility] contact for additional psychotherapy resource.  Per chart review, pt has seen Dr. Fontenot psychiatry with [facility] and states she plans to make follow up appointment.   Patient denies additional needs. SW provided motivational interviewing and encouraged patient to utilize resources for recovery.    ENOC Ugalde  1/29/2022  10:53 AM

## 2022-01-29 NOTE — PLAN OF CARE
Problem: Adult Inpatient Plan of Care  Goal: Absence of Hospital-Acquired Illness or Injury  Outcome: Adequate for Discharge  Intervention: Identify and Manage Fall Risk  Recent Flowsheet Documentation  Taken 1/29/2022 0900 by Odessa Brasher, RN  Safety Promotion/Fall Prevention:   clutter free environment maintained   fall prevention program maintained   nonskid shoes/slippers when out of bed  Intervention: Prevent Skin Injury  Recent Flowsheet Documentation  Taken 1/29/2022 0900 by Odessa Brasher RN  Body Position: position changed independently     Pt reports slight back pain heat pad applied    /68 (BP Location: Left arm)   Pulse 92   Temp 97.7  F (36.5  C) (Oral)   Resp 16   Ht 1.524 m (5')   Wt 58.1 kg (128 lb 1.4 oz)   SpO2 99%   BMI 25.02 kg/m      All discharge paperwork discussed and sent with pt. All belongings sent with pt

## 2022-01-29 NOTE — PLAN OF CARE
Problem: Adult Inpatient Plan of Care  Goal: Optimal Comfort and Wellbeing  Outcome: No Change     Problem: Adult Inpatient Plan of Care  Goal: Readiness for Transition of Care  Outcome: No Change   Pt is alert oriented x4. VSS except c/o lower back pain. Up ad dion in room. Lower back x'ray done this odilia. Pt has been medicated with PRN baclofen with little pain relief per pt. Also using heating pad PRN. Will cont to monitor pain status.

## 2022-01-29 NOTE — DISCHARGE SUMMARY
[facility] MEDICINE  DISCHARGE SUMMARY     Primary Care Physician: Shantell Ruffin  Admission Date: 1/26/2022   Discharge Provider: RENITA Diaz Discharge Date: 1/28/2022   Diet: Regular   Code Status: Full Code   Activity: DCACTIVITY: Activity as tolerated        Condition at Discharge: Stable     REASON FOR PRESENTATION(See Admission Note for Details)   Weakness    PRINCIPAL & ACTIVE DISCHARGE DIAGNOSES     Active Problems:    SIRS (systemic inflammatory response syndrome) (H)    Alcohol intoxication    Alcohol withdrawal without complications    Alcohol related liver injury    Hypomagnesemia    Microcytic anemia    PENDING LABS     Unresulted Labs Ordered in the Past 30 Days of this Admission     Date and Time Order Name Status Description    1/26/2022  3:15 AM Blood Culture Peripheral Blood Preliminary             PROCEDURES ( this hospitalization only)          RECOMMENDATIONS TO OUTPATIENT PROVIDER FOR F/U VISIT     Follow-up Appointments     Follow-up and recommended labs and tests       Follow up with primary care provider, Shantell Ruffin, within 7 days for   hospital follow- up.                 DISPOSITION     Home    SUMMARY OF HOSPITAL COURSE:      31 year old female with PMH of alcohol abuse, liver cirrhosis, chronic anemia, PTSD who presents to our ED for evaluation of weakness, tremors and abdominal pain:     Generalized weakness  Anemia  -- Anemia is microcytic with MCV of 77. B12 was normal on 3/10/21  -- Hgb of 7.3 noted on admission. Dropped down to 6.4. This was likely due to dilution. No active bleeding besides her regular menses. Discussed about blood transfusion on 1/27. Somehow she was very hesitant about blood transfusion. She stated she would let us know if she wants transfusion after talking to her mother. Agreed with blood transfusion today. Transfused an unit of pRBCs  -- Checked iron studies- deficient in iron with serum iron level of  16, and transferrin saturation at 4%. Received 2 doses of IV venofer 200mg each. Started on oral iron therapy upon discharge  -- TSH, B12 and folate level- normal.  -- CT abd/pelvis done on admission showed an ovoid lesion associated with the 4th segment of the duodenum. GI consulted. GI recommended outpatient EGD (with peds scope)/colonoscopy, possible EUS if unrevealing. [facility] will help co-ordinate.      Alcohol abuse  -- BAL noted on admission at 205  -- Advised to quit.  consulted  -- Treated per [facility] protocol. Not scoring high. Patient feels ready to go home.      Alcohol induced liver disease  -- Abnormal LFTs noted  -- Follow up with GI recommended     Lactic acidosis  Fever  -- Patient had a fever of 100.5F in ED.   -- Source of infection is unclear at the moment. Chest xray- negative. UA noted. Microscopic hematuria is likely due to menstruaion. Received 3 days of IV rocephin  -- Checked procal- negative.     Hypomagnesemia:  -- Likely associated with alcohol abuse  -- Replaced    Discharge Medications with Med changes:     Discharge Medication List as of 1/29/2022 11:01 AM      START taking these medications    Details   ferrous gluconate (FERGON) 324 (38 Fe) MG tablet Take 1 tablet (324 mg) by mouth daily (with breakfast), Disp-30 tablet, R-0, E-Prescribe      folic acid (FOLVITE) 1 MG tablet Take 1 tablet (1 mg) by mouth daily, Disp-30 tablet, R-0, E-Prescribe      pantoprazole (PROTONIX) 40 MG EC tablet Take 1 tablet (40 mg) by mouth every morning (before breakfast), Disp-30 tablet, R-0, E-Prescribe      thiamine (B-1) 100 MG tablet Take 1 tablet (100 mg) by mouth daily, Disp-30 tablet, R-0, E-Prescribe                   Rationale for medication changes:      Please see the summary above        Consults   GI      Immunizations given this encounter       There is no immunization history on file for this patient.        Anticoagulation Information      Recent INR results:   Recent Labs   Lab  01/26/22  0155   INR 1.07     Warfarin doses (if applicable) or name of other anticoagulant: NA      SIGNIFICANT IMAGING FINDINGS     Results for orders placed or performed during the hospital encounter of 01/26/22   XR Chest Port 1 View    Impression    IMPRESSION: Negative chest.   CT Abdomen Pelvis w/o Contrast    Impression    IMPRESSION:     1.  Cholelithiasis but no additional findings to suggest acute cholecystitis.  2.  Hepatic steatosis.  3.  Ovoid lesion associated with the fourth segment of the duodenum which had peripheral enhancement on the prior study is more difficult to appreciate on the current exam due to lack of IV contrast but not substantially changed. No related bowel   inflammation or mechanical obstruction.         SIGNIFICANT LABORATORY FINDINGS     Most Recent 3 CBC's:  Recent Labs   Lab Test 01/29/22  0754 01/28/22  0655 01/27/22  1232   WBC 7.4 5.2 3.6*   HGB 8.3* 6.6* 6.5*   MCV 83 80 78    181 160     Most Recent 3 BMP's:  Recent Labs   Lab Test 01/29/22  0754 01/27/22  0648 01/26/22  0155    137 144   POTASSIUM 3.5 3.6 3.8   CHLORIDE 109* 107 107   CO2 21* 22 21*   BUN 2* 4* 8   CR 0.51* 0.58* 0.56*   ANIONGAP 8 8 16   DARA 8.1* 7.9* 8.1*    106 72         Discharge Orders        Reason for your hospital stay    Alcohol intoxication, weakness.     Follow-up and recommended labs and tests     Follow up with primary care provider, Shantell Ruffin, within 7 days for hospital follow- up.     Activity    Your activity upon discharge: activity as tolerated     Diet    Follow this diet upon discharge: Orders Placed This Encounter      Combination Diet Regular Diet Adult       Examination   /68 (BP Location: Left arm)   Pulse 92   Temp 97.7  F (36.5  C) (Oral)   Resp 16   Ht 1.524 m (5')   Wt 58.1 kg (128 lb 1.4 oz)   SpO2 99%   BMI 25.02 kg/m        General: Not in obvious distress.  HEENT: NC, AT   Chest: Clear to auscultation bilaterally  Heart: S1S2 normal,  regular. No M/R/G  Abdomen: Soft. NT, ND. Bowel sounds- active.  Extremities: No legs swelling  Neuro: Alert and awake, grossly non-focal      Please see EMR for more detailed significant labs, imaging, consultant notes etc.    I, RENITA Diaz, personally saw the patient today and spent greater than 30 minutes discharging this patient.    RENITA Diaz  [facility]    CC:Shantell Ruffin

## 2022-01-29 NOTE — PLAN OF CARE
Problem: Adult Inpatient Plan of Care  Goal: Plan of Care Review  Outcome: Improving     Patient alert and oriented x 4. Slept during the night. Vital signs are stable. Denied any need for baclofen for her back pain. CIWA score was 2. Independent in the room.

## 2022-01-31 ENCOUNTER — PATIENT OUTREACH (OUTPATIENT)
Dept: CARE COORDINATION | Facility: CLINIC | Age: 32
End: 2022-01-31
Payer: COMMERCIAL

## 2022-01-31 DIAGNOSIS — Z71.89 OTHER SPECIFIED COUNSELING: ICD-10-CM

## 2022-01-31 LAB — BACTERIA BLD CULT: NO GROWTH

## 2022-01-31 NOTE — PROGRESS NOTES
Clinic Care Coordination Contact  [facility]/Voicemail       Clinical Data: Care Coordinator Outreach  Outreach attempted x 1.  Left message on patient's voicemail with call back information and requested return call.  Plan: CC SW will attempt to reach a patient in 1-2 business days.    BRIANA Quinteros   Social Work Clinic Care Coordinator   [facility]  PH: [phone]  [email or web address]

## 2022-02-01 NOTE — PROGRESS NOTES
Clinic Care Coordination Contact  [facility]/Voicemail       Clinical Data: Care Coordinator Outreach  Outreach attempted x 2.  Left message on patient's voicemail with call back information and requested return call.  Plan: CC [facility] will make no further outreaches at this time.    BRIANA Quinteros   Social Work Clinic Care Coordinator   [facility]  PH: [phone]  [email or web address]

## 2022-04-27 LAB — GLUCOSE BLDC GLUCOMTR-MCNC: 100 MG/DL (ref 70–99)

## 2022-04-27 PROCEDURE — C9803 HOPD COVID-19 SPEC COLLECT: HCPCS

## 2022-04-27 PROCEDURE — 99285 EMERGENCY DEPT VISIT HI MDM: CPT | Mod: 25

## 2022-04-27 RX ORDER — ONDANSETRON 2 MG/ML
4 INJECTION INTRAMUSCULAR; INTRAVENOUS ONCE
Status: COMPLETED | OUTPATIENT
Start: 2022-04-28 | End: 2022-04-28

## 2022-04-28 ENCOUNTER — APPOINTMENT (OUTPATIENT)
Dept: ULTRASOUND IMAGING | Facility: HOSPITAL | Age: 32
DRG: 439 | End: 2022-04-28
Attending: EMERGENCY MEDICINE
Payer: COMMERCIAL

## 2022-04-28 ENCOUNTER — HOSPITAL ENCOUNTER (INPATIENT)
Facility: HOSPITAL | Age: 32
LOS: 4 days | Discharge: HOME | DRG: 439 | End: 2022-05-02
Attending: EMERGENCY MEDICINE | Admitting: HOSPITALIST
Payer: COMMERCIAL

## 2022-04-28 DIAGNOSIS — E83.42 HYPOMAGNESEMIA: ICD-10-CM

## 2022-04-28 DIAGNOSIS — N17.9 ACUTE KIDNEY INJURY (H): ICD-10-CM

## 2022-04-28 DIAGNOSIS — K85.20 ALCOHOL-INDUCED ACUTE PANCREATITIS WITHOUT INFECTION OR NECROSIS: ICD-10-CM

## 2022-04-28 DIAGNOSIS — K29.20 ACUTE ALCOHOLIC GASTRITIS WITHOUT HEMORRHAGE: Primary | ICD-10-CM

## 2022-04-28 DIAGNOSIS — E86.0 DEHYDRATION: ICD-10-CM

## 2022-04-28 DIAGNOSIS — K70.9 ALCOHOLIC LIVER DAMAGE (H): ICD-10-CM

## 2022-04-28 DIAGNOSIS — R65.10 SIRS (SYSTEMIC INFLAMMATORY RESPONSE SYNDROME) (H): ICD-10-CM

## 2022-04-28 DIAGNOSIS — E87.20 ACIDOSIS: ICD-10-CM

## 2022-04-28 DIAGNOSIS — K59.04 CHRONIC IDIOPATHIC CONSTIPATION: ICD-10-CM

## 2022-04-28 PROBLEM — D64.9 ANEMIA: Status: ACTIVE | Noted: 2022-04-28

## 2022-04-28 PROBLEM — K85.90 PANCREATITIS: Status: ACTIVE | Noted: 2022-04-28

## 2022-04-28 PROBLEM — E87.29 KETOACIDOSIS: Status: ACTIVE | Noted: 2022-04-28

## 2022-04-28 LAB
ALBUMIN SERPL-MCNC: 5.1 G/DL (ref 3.5–5)
ALBUMIN UR-MCNC: 300 MG/DL
ALP SERPL-CCNC: 168 U/L (ref 45–120)
ALT SERPL W P-5'-P-CCNC: 38 U/L (ref 0–45)
ANION GAP SERPL CALCULATED.3IONS-SCNC: 14 MMOL/L (ref 5–18)
ANION GAP SERPL CALCULATED.3IONS-SCNC: 24 MMOL/L (ref 5–18)
ANION GAP SERPL CALCULATED.3IONS-SCNC: 33 MMOL/L (ref 5–18)
APPEARANCE UR: ABNORMAL
AST SERPL W P-5'-P-CCNC: 58 U/L (ref 0–40)
BACTERIA #/AREA URNS HPF: ABNORMAL /HPF
BASOPHILS # BLD AUTO: 0 10E3/UL (ref 0–0.2)
BASOPHILS NFR BLD AUTO: 0 %
BILIRUB DIRECT SERPL-MCNC: 0.4 MG/DL
BILIRUB SERPL-MCNC: 1 MG/DL (ref 0–1)
BILIRUB UR QL STRIP: ABNORMAL
BUN SERPL-MCNC: 10 MG/DL (ref 8–22)
BUN SERPL-MCNC: 10 MG/DL (ref 8–22)
BUN SERPL-MCNC: 11 MG/DL (ref 8–22)
CALCIUM SERPL-MCNC: 11 MG/DL (ref 8.5–10.5)
CALCIUM SERPL-MCNC: 9.2 MG/DL (ref 8.5–10.5)
CALCIUM SERPL-MCNC: 9.6 MG/DL (ref 8.5–10.5)
CHLORIDE BLD-SCNC: 105 MMOL/L (ref 98–107)
CHLORIDE BLD-SCNC: 112 MMOL/L (ref 98–107)
CHLORIDE BLD-SCNC: 112 MMOL/L (ref 98–107)
CO2 SERPL-SCNC: 17 MMOL/L (ref 22–31)
CO2 SERPL-SCNC: 7 MMOL/L (ref 22–31)
CO2 SERPL-SCNC: 8 MMOL/L (ref 22–31)
COLOR UR AUTO: ABNORMAL
CREAT SERPL-MCNC: 1.32 MG/DL (ref 0.6–1.1)
CREAT SERPL-MCNC: 1.38 MG/DL (ref 0.6–1.1)
CREAT SERPL-MCNC: 1.78 MG/DL (ref 0.6–1.1)
EOSINOPHIL # BLD AUTO: 0 10E3/UL (ref 0–0.7)
EOSINOPHIL NFR BLD AUTO: 0 %
ERYTHROCYTE [DISTWIDTH] IN BLOOD BY AUTOMATED COUNT: 15.2 % (ref 10–15)
ETHANOL SERPL-MCNC: <10 MG/DL
GFR SERPL CREATININE-BSD FRML MDRD: 38 ML/MIN/1.73M2
GFR SERPL CREATININE-BSD FRML MDRD: 52 ML/MIN/1.73M2
GFR SERPL CREATININE-BSD FRML MDRD: 55 ML/MIN/1.73M2
GLUCOSE BLD-MCNC: 101 MG/DL (ref 70–125)
GLUCOSE BLD-MCNC: 112 MG/DL (ref 70–125)
GLUCOSE BLD-MCNC: 216 MG/DL (ref 70–125)
GLUCOSE UR STRIP-MCNC: NEGATIVE MG/DL
HCT VFR BLD AUTO: 51.8 % (ref 35–47)
HGB BLD-MCNC: 15.6 G/DL (ref 11.7–15.7)
HGB UR QL STRIP: ABNORMAL
HYALINE CASTS: 55 /LPF
IMM GRANULOCYTES # BLD: 0 10E3/UL
IMM GRANULOCYTES NFR BLD: 0 %
KETONES UR STRIP-MCNC: >150 MG/DL
LACTATE SERPL-SCNC: 1 MMOL/L (ref 0.7–2)
LEUKOCYTE ESTERASE UR QL STRIP: NEGATIVE
LIPASE SERPL-CCNC: 415 U/L (ref 0–52)
LYMPHOCYTES # BLD AUTO: 0.4 10E3/UL (ref 0.8–5.3)
LYMPHOCYTES NFR BLD AUTO: 4 %
MAGNESIUM SERPL-MCNC: 2.1 MG/DL (ref 1.8–2.6)
MAGNESIUM SERPL-MCNC: 2.5 MG/DL (ref 1.8–2.6)
MCH RBC QN AUTO: 27.6 PG (ref 26.5–33)
MCHC RBC AUTO-ENTMCNC: 30.1 G/DL (ref 31.5–36.5)
MCV RBC AUTO: 92 FL (ref 78–100)
MONOCYTES # BLD AUTO: 0.6 10E3/UL (ref 0–1.3)
MONOCYTES NFR BLD AUTO: 6 %
MUCOUS THREADS #/AREA URNS LPF: PRESENT /LPF
NEUTROPHILS # BLD AUTO: 8.8 10E3/UL (ref 1.6–8.3)
NEUTROPHILS NFR BLD AUTO: 90 %
NITRATE UR QL: NEGATIVE
NRBC # BLD AUTO: 0 10E3/UL
NRBC BLD AUTO-RTO: 0 /100
PH UR STRIP: 6 [PH] (ref 5–7)
PHOSPHATE SERPL-MCNC: 1.2 MG/DL (ref 2.5–4.5)
PLATELET # BLD AUTO: 314 10E3/UL (ref 150–450)
POTASSIUM BLD-SCNC: 3.7 MMOL/L (ref 3.5–5)
POTASSIUM BLD-SCNC: 4 MMOL/L (ref 3.5–5)
POTASSIUM BLD-SCNC: 4.5 MMOL/L (ref 3.5–5)
PROT SERPL-MCNC: 10.8 G/DL (ref 6–8)
RBC # BLD AUTO: 5.66 10E6/UL (ref 3.8–5.2)
RBC URINE: 9 /HPF
SARS-COV-2 RNA RESP QL NAA+PROBE: NEGATIVE
SODIUM SERPL-SCNC: 143 MMOL/L (ref 136–145)
SODIUM SERPL-SCNC: 144 MMOL/L (ref 136–145)
SODIUM SERPL-SCNC: 145 MMOL/L (ref 136–145)
SP GR UR STRIP: 1.02 (ref 1–1.03)
SQUAMOUS EPITHELIAL: 3 /HPF
UROBILINOGEN UR STRIP-MCNC: 4 MG/DL
WBC # BLD AUTO: 9.8 10E3/UL (ref 4–11)
WBC URINE: 6 /HPF

## 2022-04-28 PROCEDURE — 82310 ASSAY OF CALCIUM: CPT | Performed by: FAMILY MEDICINE

## 2022-04-28 PROCEDURE — 96365 THER/PROPH/DIAG IV INF INIT: CPT

## 2022-04-28 PROCEDURE — 250N000011 HC RX IP 250 OP 636: Performed by: HOSPITALIST

## 2022-04-28 PROCEDURE — 258N000003 HC RX IP 258 OP 636: Performed by: FAMILY MEDICINE

## 2022-04-28 PROCEDURE — 250N000009 HC RX 250: Performed by: HOSPITALIST

## 2022-04-28 PROCEDURE — 96367 TX/PROPH/DG ADDL SEQ IV INF: CPT

## 2022-04-28 PROCEDURE — 99223 1ST HOSP IP/OBS HIGH 75: CPT | Performed by: HOSPITALIST

## 2022-04-28 PROCEDURE — 85025 COMPLETE CBC W/AUTO DIFF WBC: CPT | Performed by: FAMILY MEDICINE

## 2022-04-28 PROCEDURE — 96368 THER/DIAG CONCURRENT INF: CPT

## 2022-04-28 PROCEDURE — 83605 ASSAY OF LACTIC ACID: CPT | Performed by: EMERGENCY MEDICINE

## 2022-04-28 PROCEDURE — 36415 COLL VENOUS BLD VENIPUNCTURE: CPT | Performed by: HOSPITALIST

## 2022-04-28 PROCEDURE — 250N000009 HC RX 250: Performed by: EMERGENCY MEDICINE

## 2022-04-28 PROCEDURE — 96366 THER/PROPH/DIAG IV INF ADDON: CPT

## 2022-04-28 PROCEDURE — 83690 ASSAY OF LIPASE: CPT | Performed by: FAMILY MEDICINE

## 2022-04-28 PROCEDURE — 76705 ECHO EXAM OF ABDOMEN: CPT

## 2022-04-28 PROCEDURE — 80048 BASIC METABOLIC PNL TOTAL CA: CPT | Performed by: HOSPITALIST

## 2022-04-28 PROCEDURE — 96376 TX/PRO/DX INJ SAME DRUG ADON: CPT

## 2022-04-28 PROCEDURE — 96361 HYDRATE IV INFUSION ADD-ON: CPT

## 2022-04-28 PROCEDURE — 250N000011 HC RX IP 250 OP 636: Performed by: EMERGENCY MEDICINE

## 2022-04-28 PROCEDURE — 250N000011 HC RX IP 250 OP 636: Performed by: FAMILY MEDICINE

## 2022-04-28 PROCEDURE — 36415 COLL VENOUS BLD VENIPUNCTURE: CPT | Performed by: EMERGENCY MEDICINE

## 2022-04-28 PROCEDURE — 84100 ASSAY OF PHOSPHORUS: CPT | Performed by: HOSPITALIST

## 2022-04-28 PROCEDURE — 82248 BILIRUBIN DIRECT: CPT | Performed by: FAMILY MEDICINE

## 2022-04-28 PROCEDURE — 83735 ASSAY OF MAGNESIUM: CPT | Performed by: FAMILY MEDICINE

## 2022-04-28 PROCEDURE — 82077 ASSAY SPEC XCP UR&BREATH IA: CPT | Performed by: FAMILY MEDICINE

## 2022-04-28 PROCEDURE — 258N000003 HC RX IP 258 OP 636: Performed by: HOSPITALIST

## 2022-04-28 PROCEDURE — 87635 SARS-COV-2 COVID-19 AMP PRB: CPT | Performed by: EMERGENCY MEDICINE

## 2022-04-28 PROCEDURE — 96375 TX/PRO/DX INJ NEW DRUG ADDON: CPT

## 2022-04-28 PROCEDURE — 258N000003 HC RX IP 258 OP 636: Performed by: EMERGENCY MEDICINE

## 2022-04-28 PROCEDURE — 120N000001 HC R&B MED SURG/OB

## 2022-04-28 PROCEDURE — 36415 COLL VENOUS BLD VENIPUNCTURE: CPT | Performed by: FAMILY MEDICINE

## 2022-04-28 PROCEDURE — 250N000013 HC RX MED GY IP 250 OP 250 PS 637: Performed by: HOSPITALIST

## 2022-04-28 PROCEDURE — 83735 ASSAY OF MAGNESIUM: CPT | Performed by: HOSPITALIST

## 2022-04-28 PROCEDURE — 81003 URINALYSIS AUTO W/O SCOPE: CPT | Performed by: EMERGENCY MEDICINE

## 2022-04-28 RX ORDER — MULTIPLE VITAMINS W/ MINERALS TAB 9MG-400MCG
1 TAB ORAL DAILY
Status: DISCONTINUED | OUTPATIENT
Start: 2022-04-29 | End: 2022-05-02 | Stop reason: HOSPADM

## 2022-04-28 RX ORDER — GABAPENTIN 300 MG/1
900 CAPSULE ORAL EVERY 8 HOURS
Status: DISCONTINUED | OUTPATIENT
Start: 2022-04-28 | End: 2022-04-28 | Stop reason: DRUGHIGH

## 2022-04-28 RX ORDER — AMOXICILLIN 250 MG
1 CAPSULE ORAL 2 TIMES DAILY PRN
Status: DISCONTINUED | OUTPATIENT
Start: 2022-04-28 | End: 2022-05-02 | Stop reason: HOSPADM

## 2022-04-28 RX ORDER — HALOPERIDOL 5 MG/ML
2.5-5 INJECTION INTRAMUSCULAR EVERY 6 HOURS PRN
Status: DISCONTINUED | OUTPATIENT
Start: 2022-04-28 | End: 2022-05-02 | Stop reason: HOSPADM

## 2022-04-28 RX ORDER — GABAPENTIN 600 MG/1
1200 TABLET ORAL ONCE
Status: DISCONTINUED | OUTPATIENT
Start: 2022-04-28 | End: 2022-04-28 | Stop reason: DRUGHIGH

## 2022-04-28 RX ORDER — FOLIC ACID 1 MG/1
1 TABLET ORAL DAILY
Status: DISCONTINUED | OUTPATIENT
Start: 2022-04-29 | End: 2022-05-02 | Stop reason: HOSPADM

## 2022-04-28 RX ORDER — FLUMAZENIL 0.1 MG/ML
0.2 INJECTION, SOLUTION INTRAVENOUS
Status: DISCONTINUED | OUTPATIENT
Start: 2022-04-28 | End: 2022-05-02 | Stop reason: HOSPADM

## 2022-04-28 RX ORDER — GABAPENTIN 300 MG/1
300 CAPSULE ORAL EVERY 8 HOURS
Status: COMPLETED | OUTPATIENT
Start: 2022-04-30 | End: 2022-05-02

## 2022-04-28 RX ORDER — HEPARIN SODIUM 5000 [USP'U]/.5ML
5000 INJECTION, SOLUTION INTRAVENOUS; SUBCUTANEOUS EVERY 8 HOURS
Status: DISCONTINUED | OUTPATIENT
Start: 2022-04-28 | End: 2022-04-29

## 2022-04-28 RX ORDER — ONDANSETRON 2 MG/ML
4 INJECTION INTRAMUSCULAR; INTRAVENOUS EVERY 6 HOURS PRN
Status: DISCONTINUED | OUTPATIENT
Start: 2022-04-28 | End: 2022-05-02 | Stop reason: HOSPADM

## 2022-04-28 RX ORDER — AMOXICILLIN 250 MG
2 CAPSULE ORAL 2 TIMES DAILY PRN
Status: DISCONTINUED | OUTPATIENT
Start: 2022-04-28 | End: 2022-05-02 | Stop reason: HOSPADM

## 2022-04-28 RX ORDER — GABAPENTIN 300 MG/1
600 CAPSULE ORAL EVERY 8 HOURS
Status: COMPLETED | OUTPATIENT
Start: 2022-04-28 | End: 2022-04-30

## 2022-04-28 RX ORDER — FAMOTIDINE 10 MG
10 TABLET ORAL 2 TIMES DAILY
Status: DISCONTINUED | OUTPATIENT
Start: 2022-04-28 | End: 2022-05-02 | Stop reason: HOSPADM

## 2022-04-28 RX ORDER — GABAPENTIN 100 MG/1
100 CAPSULE ORAL EVERY 8 HOURS
Status: DISCONTINUED | OUTPATIENT
Start: 2022-05-02 | End: 2022-05-02 | Stop reason: HOSPADM

## 2022-04-28 RX ORDER — SODIUM CHLORIDE 9 MG/ML
INJECTION, SOLUTION INTRAVENOUS CONTINUOUS
Status: DISCONTINUED | OUTPATIENT
Start: 2022-04-28 | End: 2022-04-28

## 2022-04-28 RX ORDER — SODIUM CHLORIDE 9 MG/ML
1000 INJECTION, SOLUTION INTRAVENOUS CONTINUOUS
Status: DISCONTINUED | OUTPATIENT
Start: 2022-04-28 | End: 2022-04-28

## 2022-04-28 RX ORDER — DIAZEPAM 10 MG/2ML
5-10 INJECTION, SOLUTION INTRAMUSCULAR; INTRAVENOUS EVERY 30 MIN PRN
Status: DISCONTINUED | OUTPATIENT
Start: 2022-04-28 | End: 2022-05-02 | Stop reason: HOSPADM

## 2022-04-28 RX ORDER — OLANZAPINE 5 MG/1
5-10 TABLET, ORALLY DISINTEGRATING ORAL EVERY 6 HOURS PRN
Status: DISCONTINUED | OUTPATIENT
Start: 2022-04-28 | End: 2022-05-02 | Stop reason: HOSPADM

## 2022-04-28 RX ORDER — ONDANSETRON 4 MG/1
4 TABLET, ORALLY DISINTEGRATING ORAL EVERY 6 HOURS PRN
Status: DISCONTINUED | OUTPATIENT
Start: 2022-04-28 | End: 2022-05-02 | Stop reason: HOSPADM

## 2022-04-28 RX ORDER — LIDOCAINE 40 MG/G
CREAM TOPICAL
Status: DISCONTINUED | OUTPATIENT
Start: 2022-04-28 | End: 2022-05-02 | Stop reason: HOSPADM

## 2022-04-28 RX ORDER — ONDANSETRON 2 MG/ML
4 INJECTION INTRAMUSCULAR; INTRAVENOUS ONCE
Status: COMPLETED | OUTPATIENT
Start: 2022-04-28 | End: 2022-04-28

## 2022-04-28 RX ORDER — DIAZEPAM 10 MG
10 TABLET ORAL EVERY 30 MIN PRN
Status: DISCONTINUED | OUTPATIENT
Start: 2022-04-28 | End: 2022-05-02 | Stop reason: HOSPADM

## 2022-04-28 RX ADMIN — FAMOTIDINE 10 MG: 10 TABLET, FILM COATED ORAL at 21:36

## 2022-04-28 RX ADMIN — SODIUM CHLORIDE 1000 ML: 9 INJECTION, SOLUTION INTRAVENOUS at 01:26

## 2022-04-28 RX ADMIN — HYDROMORPHONE HYDROCHLORIDE 0.5 MG: 1 INJECTION, SOLUTION INTRAMUSCULAR; INTRAVENOUS; SUBCUTANEOUS at 02:05

## 2022-04-28 RX ADMIN — DEXTROSE AND SODIUM CHLORIDE: 5; 900 INJECTION, SOLUTION INTRAVENOUS at 02:55

## 2022-04-28 RX ADMIN — SODIUM BICARBONATE: 84 INJECTION, SOLUTION INTRAVENOUS at 06:46

## 2022-04-28 RX ADMIN — ONDANSETRON 4 MG: 2 INJECTION INTRAMUSCULAR; INTRAVENOUS at 05:08

## 2022-04-28 RX ADMIN — GABAPENTIN 600 MG: 300 CAPSULE ORAL at 10:23

## 2022-04-28 RX ADMIN — SODIUM BICARBONATE: 84 INJECTION, SOLUTION INTRAVENOUS at 18:03

## 2022-04-28 RX ADMIN — GABAPENTIN 600 MG: 300 CAPSULE ORAL at 16:58

## 2022-04-28 RX ADMIN — ONDANSETRON 4 MG: 2 INJECTION INTRAMUSCULAR; INTRAVENOUS at 01:27

## 2022-04-28 RX ADMIN — FAMOTIDINE 10 MG: 10 TABLET, FILM COATED ORAL at 10:23

## 2022-04-28 RX ADMIN — FOLIC ACID: 5 INJECTION, SOLUTION INTRAMUSCULAR; INTRAVENOUS; SUBCUTANEOUS at 10:05

## 2022-04-28 RX ADMIN — HYDROMORPHONE HYDROCHLORIDE 0.5 MG: 1 INJECTION, SOLUTION INTRAMUSCULAR; INTRAVENOUS; SUBCUTANEOUS at 05:07

## 2022-04-28 ASSESSMENT — ACTIVITIES OF DAILY LIVING (ADL)
FALL_HISTORY_WITHIN_LAST_SIX_MONTHS: NO
ADLS_ACUITY_SCORE: 12
WEAR_GLASSES_OR_BLIND: YES
ADLS_ACUITY_SCORE: 12
ADLS_ACUITY_SCORE: 12
DEPENDENT_IADLS:: TRANSPORTATION
ADLS_ACUITY_SCORE: 5
ADLS_ACUITY_SCORE: 12
ADLS_ACUITY_SCORE: 12
ADLS_ACUITY_SCORE: 5
ADLS_ACUITY_SCORE: 12
ADLS_ACUITY_SCORE: 12
WALKING_OR_CLIMBING_STAIRS_DIFFICULTY: NO
ADLS_ACUITY_SCORE: 12
ADLS_ACUITY_SCORE: 5
ADLS_ACUITY_SCORE: 5
ADLS_ACUITY_SCORE: 12
ADLS_ACUITY_SCORE: 12
CONCENTRATING,_REMEMBERING_OR_MAKING_DECISIONS_DIFFICULTY: YES
DOING_ERRANDS_INDEPENDENTLY_DIFFICULTY: NO
ADLS_ACUITY_SCORE: 12
DIFFICULTY_EATING/SWALLOWING: NO
TOILETING_ISSUES: NO
ADLS_ACUITY_SCORE: 12
ADLS_ACUITY_SCORE: 12
DRESSING/BATHING_DIFFICULTY: NO
CHANGE_IN_FUNCTIONAL_STATUS_SINCE_ONSET_OF_CURRENT_ILLNESS/INJURY: NO

## 2022-04-28 ASSESSMENT — ENCOUNTER SYMPTOMS
NAUSEA: 1
FREQUENCY: 1
DIARRHEA: 1
BACK PAIN: 1
ROS GI COMMENTS: POSITIVE FOR HEMATEMESIS.
ABDOMINAL PAIN: 1
VOMITING: 1

## 2022-04-28 NOTE — PHARMACY-ADMISSION MEDICATION HISTORY
Pharmacy Note - Admission Medication History    Pertinent Provider Information: Patient discharged from [facility] 1/27/22 with #30 day supply prescriptions for Iron, folic acid, Protonix & thiamine. She filled these once and has not taken in months.     ______________________________________________________________________    Prior To Admission (PTA) med list completed and updated in EMR.       No outpatient medications have been marked as taking for the 4/28/22 encounter (Hospital Encounter).       Information source(s): Patient, Hospital records and [facility]  Method of interview communication: in-person    Summary of Changes to PTA Med List  New: none  Discontinued: iron, folic acid, pantoprazole, thiamine  Changed: none    Patient was asked about OTC/herbal products specifically.  PTA med list reflects this.    In the past week, patient estimated taking medication this percent of the time:  n/a    Allergies were reviewed, assessed, and updated with the patient.      Patient does not use any multi-dose medications prior to admission.    The information provided in this note is only as accurate as the sources available at the time of the update(s).    Thank you for the opportunity to participate in the care of this patient.    Ron Barnes [facility]  4/28/2022 7:17 AM

## 2022-04-28 NOTE — ED NOTES
[facility] ED Handoff Report    ED Chief Complaint: vomiting, nausea, abdominal cramping    ED Diagnosis:  (E86.0) Dehydration  Comment:   Plan:     (N17.9) Acute kidney injury (H)  Comment:   Plan:     (K85.20) Alcohol-induced acute pancreatitis without infection or necrosis  Comment:   Plan:     (E87.2) Acidosis  Comment:   Plan:        PMH:    Past Medical History:   Diagnosis Date     Alcoholism (H)      Anxiety      Obesity         Code Status:  Prior     Falls Risk: No Band: Not applicable    Current Living Situation/Residence: lives in a house     Elimination Status: Continent: Yes     Activity Level: SBA    Patients Preferred Language:  English     Needed: No    Vital Signs:  /75   Pulse 99   Temp 97.6  F (36.4  C) (Temporal)   Resp 22   Wt 60.8 kg (134 lb)   LMP 04/18/2022   SpO2 98%   BMI 26.17 kg/m       Cardiac Rhythm: n/a    Pain Score: 7/10-back pain and abdominal cramping    Is the Patient Confused:  No    Last Food or Drink: 4/27/22 but patient vomited    Focused Assessment:  Patient c/o nausea and vomiting for 3 days, abdominal cramping, back pain, feeling weak.     Tests Performed: Done: Labs and Imaging    Treatments Provided:  See MAR. Currently has D5 in NS at 100 mL/hr and sodium bicarb 150 mEq in D5W at 100 mL/hr running.     Family Dynamics/Concerns: No    Family Updated On Visitor Policy: No    Plan of Care Communicated to Family: No    Who Was Updated about Plan of Care: n/a    Belongings Checklist Done and Signed by Patient: Yes    Medications sent with patient: n/a    Covid: asymptomatic , negative    Additional Information: Patient needing UA collected. Ice chips per Dr. Santamaria to be reassessed. Patient has history of alcohol abuse, last reported drink was 4/24/22.    RN: Aurelia Summers   4/28/2022 6:54 AM

## 2022-04-28 NOTE — ED NOTES
Patient's mother Nakia at bedside. She would like to be called with any updates at [phone] while she is gone.

## 2022-04-28 NOTE — CONSULTS
"Care Management Initial Consult    General Information  Assessment completed with: Patient, Myriam  Type of CM/SW Visit: Initial Assessment    Primary Care Provider verified and updated as needed: Yes   Readmission within the last 30 days: no previous admission in last 30 days      Reason for Consult: discharge planning  Advance Care Planning: Advance Care Planning Reviewed: no concerns identified (\"I don't have one\")          Communication Assessment  Patient's communication style: spoken language (English or Bilingual)                  Living Environment:   People in home: parent(s), sibling(s), child(ralf), dependent  Myriam, mother Shannon, brother, son age 12  Current living Arrangements: house      Able to return to prior arrangements: yes       Family/Social Support:  Care provided by: self  Provides care for: child(ralf) (son age 12)     Parent(s), Sibling(s)          Description of Support System: Supportive, Involved    Support Assessment: Adequate family and caregiver support, Adequate social supports, Patient communicates needs well met    Current Resources:   Patient receiving home care services: No     Community Resources: None  Equipment currently used at home: none  Supplies currently used at home: Other (\"glasses\")    Employment/Financial:  Employment Status: employed part-time     Employment/ Comments: \"no  history\"  Financial Concerns:     Referral to Financial Worker: No       Lifestyle & Psychosocial Needs:  Social Determinants of Health     Tobacco Use: High Risk     Smoking Tobacco Use: Current Every Day Smoker     Smokeless Tobacco Use: Never Used   Alcohol Use: Not on file   Financial Resource Strain: Not on file   Food Insecurity: Not on file   Transportation Needs: Not on file   Physical Activity: Not on file   Stress: Not on file   Social Connections: Not on file   Intimate Partner Violence: Not on file   Depression: Not on file   Housing Stability: Not on file "     Myriam lives in a house with her mother Shannon brother, and Myriam's son age 12.     She is independent with ADLs at baseline. She does not drive and family helps with transportation.    She needs CD resources or Rule 25 before discharge.     Family to transport at discharge.  Functional Status:  Prior to admission patient needed assistance:   Dependent ADLs:: Independent, Ambulation-no assistive device  Dependent IADLs:: Transportation  Assesssment of Functional Status: At functional baseline    Mental Health Status:  Mental Health Status: Past Concern  Mental Health Management: Medication, Psychiatrist    Chemical Dependency Status:  Chemical Dependency Status: Current Concern             Values/Beliefs:  Spiritual, Cultural Beliefs, Scientologist Practices, Values that affect care:                 Additional Information:  Myriam lives in a house with her mother Shannon, brother, and Myriam's son age 12.     She is independent with ADLs at baseline. She does not drive and family helps with transportation.    She needs CD resources or Rule 25 before discharge.     Family to transport at discharge.    Olga Snow RN

## 2022-04-28 NOTE — ED PROVIDER NOTES
EMERGENCY DEPARTMENT ENCOUNTER      NAME: Myriam Porter  AGE: 31 year old female  YOB: 1990  MRN: [id number]  EVALUATION DATE & TIME: 2022 12:41 AM    PCP: Shantell Ruffin    ED PROVIDER: Shilpa Santamaria M.D.      Chief Complaint   Patient presents with     Vomiting         FINAL IMPRESSION:  1. Dehydration    2. Acute kidney injury (H)    3. Alcohol-induced acute pancreatitis without infection or necrosis        MEDICAL DECISION MAKIN:45 AM I met with the patient, obtained history, performed an initial exam, and discussed options and plan for diagnostics and treatment here in the ED.   Pertinent Labs & Imaging studies reviewed. (See chart for details). PPE: Provider wore gloves, N95 mask, eye protection, and paper mask.      5:51 AM I received a critical lab for the patient: CO2 of 8.     Myriam Porter is a 31 year old female who presents with abdominal pain, nausea and vomiting since Monday.  She has a history of alcoholism and has been doing well but had significant intake of alcohol on .  Symptoms have been present since the following morning.  She has been unable to successfully keep anything down.  She denies have any fever.  She denies blood in the urine, blood in the stool or blood in the emesis.  She does not have a fever.  She is tachycardic but other vitals appear normal.  I suspect alcoholic gastritis, pancreatitis, possible starvation ketosis, dehydration, with possible electrolyte abnormality.  No focal us in the lower abdomen and without a fever, lower concern for a surgical process such as cholecystitis or appendicitis.  I will get labs to evaluate, give her fluids, Zofran, pain medications and decide on imaging based upon lab results.    She has some fairly significant laboratory abnormalities.  These include acute kidney injury with elevated creatinine, lipase in the 500s, elevated alk phos, and low bicarb.  Lactic acid is normal.  Based upon an  elevation in the alk phos and patient denying history of cholecystectomy, we will proceed with right upper quadrant ultrasound to evaluate.  Normal saline will be switched to D5 normal saline in an attempt to resolve the anion gap and acidosis.    Recheck of her BMP at 5:01 AM does show an improvement in her creatinine and anion gap with these are still elevated and carbon dioxide has not significantly improved.  She is still feeling nauseated so she will get a second dose of Zofran.  Without sufficient improvement in her chemistries, I will plan admission to the hospital for rehydration, treatment of her pancreatitis and resolution of her anion gap acidosis.  Discussed with Dr. Moser who accepted the patient for further cares.       MEDICATIONS GIVEN IN THE EMERGENCY:  Medications   ondansetron (ZOFRAN) injection 4 mg (has no administration in time range)   HYDROmorphone (DILAUDID) injection 0.5 mg (has no administration in time range)   0.9% sodium chloride BOLUS (0 mLs Intravenous Stopped 4/28/22 0255)   ondansetron (ZOFRAN) injection 4 mg (4 mg Intravenous Given 4/28/22 0127)   HYDROmorphone (DILAUDID) injection 0.5 mg (0.5 mg Intravenous Given 4/28/22 0205)   dextrose 5% and 0.9% NaCl infusion ( Intravenous New Bag 4/28/22 0255)       =================================================================    HPI    Patient information was obtained from: The patient    Use of : Use of : N/A       Myriamdion Porter is a 31 year old female with a history of alcohol dependence, anemia, and anxiety who presents with vomiting.    The patient reports that since 4/25 she has been having numerous episodes of vomiting and cannot eat anything without vomiting. The patient states her vomit tastes like acid and she has not seen any blood in it. Additionally, the patient endorses a cramping abdominal pain, back tightness/pain, chest pain, and mild diarrhea. Furthermore, the patient reports increased urinary  frequency. The patient notes she has a history of alcohol abuse and stopped drinking in January of 2022 but had her first drink since then on 4/24. The patient denies any history of abdominal surgeries. Denies chance of pregnancy. Denies known history of diabetes mellitus. Denies any other symptoms or complaints at this time.     REVIEW OF SYSTEMS   Review of Systems   Cardiovascular: Positive for chest pain.   Gastrointestinal: Positive for abdominal pain, diarrhea, nausea and vomiting.        Positive for hematemesis.   Genitourinary: Positive for frequency.   Musculoskeletal: Positive for back pain.   All other systems reviewed and are negative.       PAST MEDICAL HISTORY:  Past Medical History:   Diagnosis Date     Anxiety      Obesity        PAST SURGICAL HISTORY:  History reviewed. No pertinent surgical history.    CURRENT MEDICATIONS:      Current Facility-Administered Medications:      HYDROmorphone (DILAUDID) injection 0.5 mg, 0.5 mg, Intravenous, Once, Shilpa Santamaria MD     ondansetron (ZOFRAN) injection 4 mg, 4 mg, Intravenous, Once, Shilpa Santamaria MD    Current Outpatient Medications:      ferrous gluconate (FERGON) 324 (38 Fe) MG tablet, Take 1 tablet (324 mg) by mouth daily (with breakfast), Disp: 30 tablet, Rfl: 0     folic acid (FOLVITE) 1 MG tablet, Take 1 tablet (1 mg) by mouth daily, Disp: 30 tablet, Rfl: 0     pantoprazole (PROTONIX) 40 MG EC tablet, Take 1 tablet (40 mg) by mouth every morning (before breakfast), Disp: 30 tablet, Rfl: 0     thiamine (B-1) 100 MG tablet, Take 1 tablet (100 mg) by mouth daily, Disp: 30 tablet, Rfl: 0    ALLERGIES:  No Known Allergies    FAMILY HISTORY:  History reviewed. No pertinent family history.    SOCIAL HISTORY:   Social History     Tobacco Use     Smoking status: Current Every Day Smoker     Smokeless tobacco: Never Used   Substance Use Topics     Alcohol use: Yes     Comment: heavy     Drug use: Not Currently        PHYSICAL EXAM:    Vitals: BP  118/81   Pulse 110   Temp 97.6  F (36.4  C) (Temporal)   Resp 22   Wt 60.8 kg (134 lb)   LMP 04/18/2022   SpO2 99%   BMI 26.17 kg/m     General:. Alert and interactive, comfortable appearing. No acute distress.  HENT: Oropharynx without erythema or exudates. MMM.  TMs clear bilaterally.  Eyes: Pupils mid-sized and equally reactive.   Neck: Full AROM.  No midline tenderness to palpation.  Cardiovascular: tachycardic rate and regular rhythm. Peripheral pulses 2+ bilaterally.  Chest/Pulmonary: Normal work of breathing. Lung sounds clear and equal throughout, no wheezes or crackles. No chest wall tenderness or deformities.  Abdomen: Soft, nondistended. Tenderness to RUQ into epigastrium. No guarding. Bowel sounds are normal.  Back/Spine: No CVA or midline tenderness.  Extremities: Normal ROM of all major joints. No lower extremity edema.   Skin: Warm and dry. Normal skin color.   Neuro: Speech clear. CNs grossly intact. Moves all extremities appropriately. Strength and sensation grossly intact to all extremities.   Psych: Normal affect/mood, cooperative, memory appropriate.     LAB:  All pertinent labs reviewed and interpreted.  Labs Ordered and Resulted from Time of ED Arrival to Time of ED Departure   GLUCOSE BY METER - Abnormal       Result Value    GLUCOSE BY METER POCT 100 (*)    BASIC METABOLIC PANEL - Abnormal    Sodium 145      Potassium 4.5      Chloride 105      Carbon Dioxide (CO2) 7 (*)     Anion Gap 33 (*)     Urea Nitrogen 10      Creatinine 1.78 (*)     Calcium 11.0 (*)     Glucose 101      GFR Estimate 38 (*)    LIPASE - Abnormal    Lipase 415 (*)    HEPATIC FUNCTION PANEL - Abnormal    Bilirubin Total 1.0      Bilirubin Direct 0.4      Protein Total 10.8 (*)     Albumin 5.1 (*)     Alkaline Phosphatase 168 (*)     AST 58 (*)     ALT 38     CBC WITH PLATELETS AND DIFFERENTIAL - Abnormal    WBC Count 9.8      RBC Count 5.66 (*)     Hemoglobin 15.6      Hematocrit 51.8 (*)     MCV 92      MCH 27.6       MCHC 30.1 (*)     RDW 15.2 (*)     Platelet Count 314      % Neutrophils 90      % Lymphocytes 4      % Monocytes 6      % Eosinophils 0      % Basophils 0      % Immature Granulocytes 0      NRBCs per 100 WBC 0      Absolute Neutrophils 8.8 (*)     Absolute Lymphocytes 0.4 (*)     Absolute Monocytes 0.6      Absolute Eosinophils 0.0      Absolute Basophils 0.0      Absolute Immature Granulocytes 0.0      Absolute NRBCs 0.0     MAGNESIUM - Normal    Magnesium 2.5     ETHYL ALCOHOL LEVEL - Normal    Alcohol, Blood <10     LACTIC ACID WHOLE BLOOD - Normal    Lactic Acid 1.0     COVID-19 VIRUS (CORONAVIRUS) BY PCR - Normal    SARS CoV2 PCR Negative     ROUTINE UA WITH MICROSCOPIC REFLEX TO CULTURE   BASIC METABOLIC PANEL       RADIOLOGY:  Abdomen US, limited (RUQ only)   Final Result   IMPRESSION:   1.  Fatty infiltration of the liver.      2.  Normal gallbladder.      3.  No dilatation of the bile ducts.              I, Alex Mancini, am serving as a scribe to document services personally performed by Dr. Shilpa Santamaria  based on my observation and the provider's statements to me. I, Shilpa Santamaria MD attest that Alex Mancini is acting in a scribe capacity, has observed my performance of the services and has documented them in accordance with my direction.      Shilpa Santamaria M.D.  Emergency Medicine  [facility] EMERGENCY DEPARTMENT  [address] [facility] [id number] [phone]  Dept: [phone]         Shilpa Santamaria MD  04/28/22 0609

## 2022-04-28 NOTE — ED TRIAGE NOTES
Reports vomiting and sore throat for 3 days. History of liver disease. Also reports urinary frequency.      Triage Assessment     Row Name 04/27/22 1738       Triage Assessment (Adult)    Airway WDL WDL       Respiratory WDL    Respiratory WDL WDL       Skin Circulation/Temperature WDL    Skin Circulation/Temperature WDL WDL       Cardiac WDL    Cardiac WDL X;rhythm    Cardiac Rhythm tachycardic       Peripheral/Neurovascular WDL    Peripheral Neurovascular WDL WDL       Cognitive/Neuro/Behavioral WDL    Cognitive/Neuro/Behavioral WDL WDL

## 2022-04-28 NOTE — H&P
[facility]    History and Physical - Hospitalist Service       Date of Admission:  4/28/2022    Assessment & Plan      Myriam Porter is a 31 year old female admitted on 4/28/2022. She has  past medical history of alcohol abuse, chronic anemia, PTSD presented today with chief complaints of nausea and vomiting associated with abdominal pain of 3 days duration.  Patient has a history of alcohol abuse, last drink was 4 days prior to admission following which the symptoms started.  Lipase mildly elevated.  Work-up suggestive of  anion gap metabolic acidosis, alcoholic ketoacidosis, MEENAKSHI and mild pancreatitis.  Ultrasound of the abdomen is unremarkable.    Pancreatitis - mild - likely alcohol induced  -Patient with history of alcohol abuse, noted to have mildly elevated lipase in 400s.  Abdominal ultrasound fairly unremarkable  -Start patient's clear liquid diet,  -IVF, MVI, thiamine, folate, pain control  -Continue to monitor electrolytes    Abdominal pain #secondary to above  -Patient presented with abdominal pain with history of recent binge drinking.  Labs remarkable for mildly elevated lipase.  Ultrasound fairly unremarkable.  -Keep on clear liquid diet for today.  Will advance tomorrow if doing well  -Pain control    Nausea/vomiting likely secondary to #1  -Zofran prn ordered    Alcohol withdrawal  -Patient is on and off history of alcohol abuse for the last 4 years.  Last drink was 4 days prior to admission.  Blood alcohol levels on admission is<10  -Monitor on telemetry  -Placed on CIWA protocol  -IVF, MVI, thiamine, folate  - consulted for CD    Alcoholic gastritis - start H2 blocker    MEENAKSHI likely prerenal given nausea and vomiting  Dehydration-patient looks clinically dehydrated  -Continue IVF, recheck BMP in few hours  -Minimize nephrotoxins, continue to trend creatinine    AGMA, volume depletion, alcoholic ketoacidosis  -Continue patient on bicarb drip, recheck BMP  later today.   -Monitor electrolytes closely       Diet: Combination Diet Regular Diet Adult, Clear Liquid; Low Saturated Fat Diet  DVT Prophylaxis: Heparin  Calderon Catheter: Not present  Central Lines: None  Code Status: Full Code, discussed with patient    Disposition Plan   Expected Discharge: 1-2 days  Anticipated discharge location:  Awaiting care coordination huddle  Delays: EtoH withdrawal       The patient's care was discussed with the Patient.    Haily Valencia MD  [facility]  ______________________________________________________________________    Chief Complaint   Abd pain    History is obtained from the patient    History of Present Illness   Myriam Porter is a 31 year old female with past medical history of alcohol abuse, chronic anemia, PTSD presented today with chief complaints of nausea and vomiting associated with abdominal pain of 3 days duration.  Patient stated she drank wine about 4 days ago.  The following day patient started feeling sick and reports having abdominal pain in the upper part.sex radiate across her abdomen.  Currently rates pain as 7/10 in severity.  Associated nausea and vomiting, has been throwing up for the last 3 days and has not been able to keep food down.  She has history of constipation.  Denies any diarrhea or fever.  Has occasional cough.  No shortness of breath.  Reports having chest pain from constant emesis and feeling sore in her mouth due to acid reflux.  Having some generalized weakness.  Denies any shortness of breath, dysuria or polyuria.  Occasional dizzy.  No palpitations.  No tingling or numbness. Denies any hallucinations or delusions.  No recent travel, exposures to COVID or sick contact.  Patient states she has been on and off and drinking for the last 4 years.  States she started drinking June of this year.    In ER, on arrival, patient noted to be tachycardic.  Labs remarkable for elevated creatinine, low CO2, slightly  elevated lipase and AST.Open anion gap.  COVID test negative.  Abdominal ultrasound showed fatty liver.  Patient alcohol drawl, severe metabolic acidosis and mild pancreatitis patient be hospitalized for further management.          Review of Systems    The 10 point Review of Systems is negative other than noted in the HPI or here.     Past Medical History    I have reviewed this patient's medical history and updated it with pertinent information if needed.   Past Medical History:   Diagnosis Date     Alcoholism (H)      Anxiety      Obesity        Past Surgical History   Reviewed and noncontributory    Social History   I have reviewed this patient's social history and updated it with pertinent information if needed.  Social History     Tobacco Use     Smoking status: Current Every Day Smoker     Smokeless tobacco: Never Used   Substance Use Topics     Alcohol use: Yes     Comment: heavy     Drug use: Not Currently       Family History   Reviewed and non contributory    Prior to Admission Medications   None     Allergies   No Known Allergies    Physical Exam   Vital Signs: Temp: 97.6  F (36.4  C) Temp src: Temporal BP: 116/77 Pulse: 100   Resp: 22 SpO2: 100 % O2 Device: None (Room air)    Weight: 134 lbs 0 oz    General:  Appears stated age, no acute distress. A&O x 3.  Skin:  Warm, dry. No rashes or lesions on exposed skin.  HEENT:  Normocephalic, atraumatic; EOMs grossly intact, oral mucosa dry  Neck:  Supple.  Chest:  Breath sounds CTA and no increased work of breathing on room air.  Cardiovascular:  RRR,No peripheral edema.  Abdomen:  Soft, epigastric tenderness, no rigidity, guarding,  non-distended.  Musculoskeletal:  Moves all four extremities. No muscle atrophy.  Neurological:  CN 2-12 grossly intact.    Data   Data reviewed today: I reviewed all medications, new labs and imaging results over the last 24 hours.  I personally reviewed lab and abd US    Recent Labs   Lab 04/28/22  0501 04/28/22  0102  04/27/22  2343   WBC  --  9.8  --    HGB  --  15.6  --    MCV  --  92  --    PLT  --  314  --     145  --    POTASSIUM 4.0 4.5  --    CHLORIDE 112* 105  --    CO2 8* 7*  --    BUN 11 10  --    CR 1.38* 1.78*  --    ANIONGAP 24* 33*  --    DARA 9.6 11.0*  --     101 100*   ALBUMIN  --  5.1*  --    PROTTOTAL  --  10.8*  --    BILITOTAL  --  1.0  --    ALKPHOS  --  168*  --    ALT  --  38  --    AST  --  58*  --    LIPASE  --  415*  --        Imaging:  Recent Results (from the past 24 hour(s))   Abdomen US, limited (RUQ only)    Narrative    EXAM: US ABDOMEN LIMITED  LOCATION: [facility]  DATE/TIME: 4/28/2022 3:19 AM    INDICATION: RUQpain  COMPARISON: Ultrasound 07/01/2020  TECHNIQUE: Limited abdominal ultrasound.    FINDINGS:    GALLBLADDER: Normal. No gallstones, wall thickening, or pericholecystic fluid. Negative sonographic Barrett's sign.    BILE DUCTS: No biliary dilatation. The common duct measures 7 mm.    LIVER: There is moderate increased parenchymal echogenicity most typical for fatty infiltration. No focal mass.    RIGHT KIDNEY: No hydronephrosis.    PANCREAS: The visualized portions are normal.    No ascites.      Impression    IMPRESSION:  1.  Fatty infiltration of the liver.    2.  Normal gallbladder.    3.  No dilatation of the bile ducts.

## 2022-04-28 NOTE — ED NOTES
"[facility] ED Handoff Report    ED Chief Complaint: vomiting    ED Diagnosis:  (E86.0) Dehydration  Comment: vomiting and diarrhea  Plan: fluids    (N17.9) Acute kidney injury (H)  Comment:   Plan:     (K85.20) Alcohol-induced acute pancreatitis without infection or necrosis  Comment:   Plan: patient received banana bag in ED. CIWA q 4hrs, last one was at 1600 and was a 2    (E87.2) Acidosis  Comment:   Plan: Sodium bicarb D5W @100ml/hr       PMH:    Past Medical History:   Diagnosis Date     Alcoholism (H)      Anxiety      Obesity         Code Status:  Full Code     Falls Risk: No Band: Not applicable    Current Living Situation/Residence: lives alone     Elimination Status: Continent: Yes     Activity Level: Independent    Patients Preferred Language:  English     Needed: No    Vital Signs:  /74   Pulse 104   Temp 97.6  F (36.4  C) (Temporal)   Resp 22   Wt 60.8 kg (134 lb)   LMP 04/18/2022   SpO2 98%   BMI 26.17 kg/m       Cardiac Rhythm: NSR    Pain Score: 0/10    Is the Patient Confused:  No    Last Food or Drink: 04/28/22 at 1700 clear liquid diet    Focused Assessment:  Patient has history of alcohol dependence. Has been sober since January this year, relapsed 4/24,  Has been vomiting and states it \"taste like acid\" has also had some intermittent abdominal cramping, diarrhea, back tightness.  Patient has been resting comfortably and sleeping the majority of the afternoon and denies pain at this time. Patient ambulates independently to bathroom when needed.    Tests Performed: Done: Labs and Imaging    Treatments Provided:  Labs/imaging, banana bag and sodium bicarb infusion    Family Dynamics/Concerns: No    Family Updated On Visitor Policy: Yes    Plan of Care Communicated to Family: Yes    Who Was Updated about Plan of Care: patient updated family independently    Medications sent with patient:     Covid: symptomatic, negative    Additional Information: patient refused " heparin injection x 2 today    RN: Lois Corea RN   4/28/2022 6:35 PM

## 2022-04-29 LAB
ALBUMIN SERPL-MCNC: 3.3 G/DL (ref 3.5–5)
ALP SERPL-CCNC: 110 U/L (ref 45–120)
ALT SERPL W P-5'-P-CCNC: 26 U/L (ref 0–45)
ANION GAP SERPL CALCULATED.3IONS-SCNC: 15 MMOL/L (ref 5–18)
AST SERPL W P-5'-P-CCNC: 72 U/L (ref 0–40)
BILIRUB SERPL-MCNC: 1 MG/DL (ref 0–1)
BUN SERPL-MCNC: 3 MG/DL (ref 8–22)
CALCIUM SERPL-MCNC: 8.6 MG/DL (ref 8.5–10.5)
CHLORIDE BLD-SCNC: 91 MMOL/L (ref 98–107)
CO2 SERPL-SCNC: 29 MMOL/L (ref 22–31)
CREAT SERPL-MCNC: 0.65 MG/DL (ref 0.6–1.1)
ERYTHROCYTE [DISTWIDTH] IN BLOOD BY AUTOMATED COUNT: 14.8 % (ref 10–15)
GFR SERPL CREATININE-BSD FRML MDRD: >90 ML/MIN/1.73M2
GLUCOSE BLD-MCNC: 89 MG/DL (ref 70–125)
HCT VFR BLD AUTO: 35.4 % (ref 35–47)
HGB BLD-MCNC: 11.6 G/DL (ref 11.7–15.7)
LIPASE SERPL-CCNC: 729 U/L (ref 0–52)
MAGNESIUM SERPL-MCNC: 1.4 MG/DL (ref 1.8–2.6)
MCH RBC QN AUTO: 28 PG (ref 26.5–33)
MCHC RBC AUTO-ENTMCNC: 32.8 G/DL (ref 31.5–36.5)
MCV RBC AUTO: 85 FL (ref 78–100)
PHOSPHATE SERPL-MCNC: 1.3 MG/DL (ref 2.5–4.5)
PLATELET # BLD AUTO: 175 10E3/UL (ref 150–450)
POTASSIUM BLD-SCNC: 2.5 MMOL/L (ref 3.5–5)
POTASSIUM BLD-SCNC: 2.7 MMOL/L (ref 3.5–5)
PROT SERPL-MCNC: 6.7 G/DL (ref 6–8)
RBC # BLD AUTO: 4.15 10E6/UL (ref 3.8–5.2)
SODIUM SERPL-SCNC: 135 MMOL/L (ref 136–145)
WBC # BLD AUTO: 7.2 10E3/UL (ref 4–11)

## 2022-04-29 PROCEDURE — 84132 ASSAY OF SERUM POTASSIUM: CPT | Performed by: HOSPITALIST

## 2022-04-29 PROCEDURE — 83690 ASSAY OF LIPASE: CPT | Performed by: HOSPITALIST

## 2022-04-29 PROCEDURE — 250N000011 HC RX IP 250 OP 636: Performed by: HOSPITALIST

## 2022-04-29 PROCEDURE — 99233 SBSQ HOSP IP/OBS HIGH 50: CPT | Performed by: HOSPITALIST

## 2022-04-29 PROCEDURE — 84100 ASSAY OF PHOSPHORUS: CPT | Performed by: HOSPITALIST

## 2022-04-29 PROCEDURE — 80053 COMPREHEN METABOLIC PANEL: CPT | Performed by: HOSPITALIST

## 2022-04-29 PROCEDURE — 120N000001 HC R&B MED SURG/OB

## 2022-04-29 PROCEDURE — 85014 HEMATOCRIT: CPT | Performed by: HOSPITALIST

## 2022-04-29 PROCEDURE — 36415 COLL VENOUS BLD VENIPUNCTURE: CPT | Performed by: HOSPITALIST

## 2022-04-29 PROCEDURE — 258N000003 HC RX IP 258 OP 636: Performed by: EMERGENCY MEDICINE

## 2022-04-29 PROCEDURE — 250N000009 HC RX 250: Performed by: EMERGENCY MEDICINE

## 2022-04-29 PROCEDURE — 83735 ASSAY OF MAGNESIUM: CPT | Performed by: HOSPITALIST

## 2022-04-29 PROCEDURE — 258N000003 HC RX IP 258 OP 636: Performed by: HOSPITALIST

## 2022-04-29 PROCEDURE — 250N000013 HC RX MED GY IP 250 OP 250 PS 637: Performed by: HOSPITALIST

## 2022-04-29 RX ORDER — POTASSIUM CHLORIDE 1500 MG/1
40 TABLET, EXTENDED RELEASE ORAL ONCE
Status: COMPLETED | OUTPATIENT
Start: 2022-04-29 | End: 2022-04-29

## 2022-04-29 RX ORDER — POTASSIUM CHLORIDE 1.5 G/1.58G
40 POWDER, FOR SOLUTION ORAL ONCE
Status: COMPLETED | OUTPATIENT
Start: 2022-04-29 | End: 2022-04-29

## 2022-04-29 RX ORDER — SODIUM CHLORIDE, SODIUM LACTATE, POTASSIUM CHLORIDE, CALCIUM CHLORIDE 600; 310; 30; 20 MG/100ML; MG/100ML; MG/100ML; MG/100ML
INJECTION, SOLUTION INTRAVENOUS CONTINUOUS
Status: DISCONTINUED | OUTPATIENT
Start: 2022-04-29 | End: 2022-05-02 | Stop reason: HOSPADM

## 2022-04-29 RX ADMIN — SODIUM BICARBONATE: 84 INJECTION, SOLUTION INTRAVENOUS at 06:00

## 2022-04-29 RX ADMIN — FAMOTIDINE 10 MG: 10 TABLET, FILM COATED ORAL at 08:35

## 2022-04-29 RX ADMIN — POTASSIUM CHLORIDE 40 MEQ: 1.5 POWDER, FOR SOLUTION ORAL at 21:05

## 2022-04-29 RX ADMIN — GABAPENTIN 600 MG: 300 CAPSULE ORAL at 17:50

## 2022-04-29 RX ADMIN — Medication 1 TABLET: at 08:35

## 2022-04-29 RX ADMIN — SODIUM CHLORIDE, POTASSIUM CHLORIDE, SODIUM LACTATE AND CALCIUM CHLORIDE: 600; 310; 30; 20 INJECTION, SOLUTION INTRAVENOUS at 14:34

## 2022-04-29 RX ADMIN — FOLIC ACID 1 MG: 1 TABLET ORAL at 08:34

## 2022-04-29 RX ADMIN — GABAPENTIN 600 MG: 300 CAPSULE ORAL at 08:34

## 2022-04-29 RX ADMIN — POTASSIUM CHLORIDE 40 MEQ: 1500 TABLET, EXTENDED RELEASE ORAL at 14:34

## 2022-04-29 RX ADMIN — Medication 5 MG: at 00:35

## 2022-04-29 RX ADMIN — POTASSIUM & SODIUM PHOSPHATES POWDER PACK 280-160-250 MG 2 PACKET: 280-160-250 PACK at 21:19

## 2022-04-29 RX ADMIN — THIAMINE HCL TAB 100 MG 100 MG: 100 TAB at 08:35

## 2022-04-29 RX ADMIN — FAMOTIDINE 10 MG: 10 TABLET, FILM COATED ORAL at 21:05

## 2022-04-29 RX ADMIN — GABAPENTIN 600 MG: 300 CAPSULE ORAL at 00:32

## 2022-04-29 ASSESSMENT — ACTIVITIES OF DAILY LIVING (ADL)
ADLS_ACUITY_SCORE: 5

## 2022-04-29 NOTE — UTILIZATION REVIEW
Admission Status; Secondary Review Determination   Under the authority of the Utilization Management Committee, the utilization review process indicated a secondary review on Myriam Porter. The review outcome is based on review of the medical records, discussions with staff, and applying clinical experience noted on the date of the review.   (x) Inpatient Status Appropriate - This patient's medical care is consistent with medical management for inpatient care and reasonable inpatient medical practice.     RATIONALE FOR DETERMINATION   31-year-old female with a history of alcohol abuse admitted with alcohol induced pancreatitis, anion gap metabolic acidosis, alcoholic ketoacidosis and MEENAKSHI.  Remains on bicarb drip today.  Now on clear liquid diet, likely to advance to soft diet later.  Continuing IV fluids and IV vitamins and monitor electrolytes, has hypophosphatemia, replacement ordered.  Also with nausea and vomiting and as needed Zofran ordered.  Started on CIWA protocol and  consulted for chemical dependency.  Has MEENAKSHI which persists today and continuing IV fluids.    At the time of admission with the information available to the attending physician more than 2 nights Hospital complex care was anticipated, based on patient risk of adverse outcome if treated as outpatient and complex care required. Inpatient admission is appropriate based on the Medicare guidelines.   The information on this document is developed by the utilization review team in order for the business office to ensure compliance. This only denotes the appropriateness of proper admission status and does not reflect the quality of care rendered.   The definitions of Inpatient Status and Observation Status used in making the determination above are those provided in the CMS Coverage Manual, Chapter 1 and Chapter 6, section 70.4.   Sincerely,   Reece Cao MD  Utilization Review  Physician Advisor  [facility]  Services

## 2022-04-29 NOTE — PROGRESS NOTES
Patient admitted from ED today with ketoacidoses. Patient reports that her mouth taste like acid from vomiting. Patient has alcohol dependence. Patient is alert and oriented. Afebrile. Tachycardic. Denies pain states that her discomfort is tasting acid after vomiting. Lungs are clear diminished. She is on room air with oxygen saturations at 98%.  Patient is on clear liquid diet with with IVF. CIWA score 2. Monitor.

## 2022-04-29 NOTE — PROGRESS NOTES
.  Hospitalist Progress Note    Assessment/Plan  Active Problems:    Ketoacidosis    Pancreatitis    Acute kidney injury (H)    Alcohol-induced acute pancreatitis without infection or necrosis    Myriam Porter is a 31 year old female admitted on 4/28/2022. She has  past medical history of alcohol abuse, chronic anemia, PTSD presented today with chief complaints of nausea and vomiting associated with abdominal pain of 3 days duration.  Patient has a history of alcohol abuse, last drink was 4 days prior to admission following which the symptoms started.  Lipase mildly elevated.  Work-up suggestive of  anion gap metabolic acidosis, alcoholic ketoacidosis, MEENAKSHI and mild pancreatitis.  Ultrasound of the abdomen is unremarkable.     Pancreatitis - mild - likely alcohol induced  -Patient with history of alcohol abuse, noted to have mildly elevated lipase in 400s.  Abdominal ultrasound fairly unremarkable  -Continue clear liquid diet, likely soft diet later today  -IVF, MVI, thiamine, folate, pain control  -Continue to monitor electrolytes     Abdominal pain #secondary to above  -Patient presented with abdominal pain with history of recent binge drinking.  Labs remarkable for mildly elevated lipase.  Ultrasound fairly unremarkable.  -Tolerating clear liquid diet, likely advance to soft diet later today pending today's labs.   -Pain control     Nausea/vomiting likely secondary to #1  -Zofran prn ordered     Alcohol withdrawal  -Patient is on and off history of alcohol abuse for the last 4 years.  Last drink was 4 days prior to admission.  Blood alcohol levels on admission is<10  -Monitor on telemetry  -Placed on CIWA protocol, scoring low for now  -IVF, MVI, thiamine, folate  - consulted for CD     Alcoholic gastritis - start H2 blocker     MEENAKSHI likely prerenal given nausea and vomiting  Dehydration-patient looks clinically dehydrated  -Continue IVF  -Minimize nephrotoxins, continue to trend creatinine,  trending down so far     AGMA, volume depletion, alcoholic ketoacidosis  -Continue patient on bicarb drip, follow up today's labs, pending sample collection.   -Monitor electrolytes closely       Addendum:  Reviewed labs, stop bicarb drip  Advance to soft diet as pt clinically improving although lipse slightly up.  K low-->replete.     Barriers to Discharge:  Tolerate IV, clinical improvement    Anticipated discharge date/Disposition: Tomorrow    Subjective  .  Patient is new to me. Chart reviewed and events noted.  Patient seen and examined.   Pt states feels better still has abd pain, feels as if she is working in gym. Pain in upper abdomen. Nausea/vomiting better. No fever. Tolerating clear liquid diet    Objective    Vital signs in last 24 hours  Temp:  [98.3  F (36.8  C)-99.6  F (37.6  C)] 98.4  F (36.9  C)  Pulse:  [] 100  Resp:  [16-19] 16  BP: (101-123)/(60-78) 109/63  SpO2:  [96 %-100 %] 99 % [unfilled] O2 Device: None (Room air)    Weight:   [unfilled] Weight change:     Intake/Output last 3 shifts  I/O last 3 completed shifts:  In: 480 [P.O.:480]  Out: -   Body mass index is 26.17 kg/m .    Physical Exam    General Appearance:    Alert, cooperative, no distress, appears stated age  HEENT:AT,NC, oral mucosa dry.   Lungs:     CTAB   Cardiovascular:    RRR   Abdomen:     Soft, epigastric tenderness, non-distended.    Neurologic:   Grossly normal      Pertinent Labs   Lab Results: personally reviewed.   Recent Labs   Lab 04/28/22  1109 04/28/22  0501 04/28/22  0102    144 145   CO2 17* 8* 7*   BUN 10 11 10   ALBUMIN  --   --  5.1*   ALKPHOS  --   --  168*   ALT  --   --  38   AST  --   --  58*     Recent Labs   Lab 04/28/22  0102   WBC 9.8   HGB 15.6   HCT 51.8*        No results for input(s): CKTOTAL, TROPONINI in the last 168 hours.    Invalid input(s): TROPONINT, CKMBINDEX  Invalid input(s): POCGLUFGR    Medications  Drug and lactation database from the [employer]:  http://toxnet.nlm.nih.gov/cgi-bin/sis/htmlgen?LACT      Pertinent Radiology   Radiology Results:  Personally reviewed impressions    Reviewed labs in last 24 hours.    Advanced Care Planning:  Discharge Planning discussed with patient  Total time with this patient is 35 min with greater than 50% of time spent in coordination of care.  Care discussed and coordinated with patient, RN, SW/CM.    This Note is created using dragon voice recognition software.  Errors in spelling or words which seems out of context are unintentional.  Sounds alike errors may have escaped editing.    Haily Valencia MD  Hospitalist

## 2022-04-29 NOTE — PLAN OF CARE
"  Problem: Plan of Care - These are the overarching goals to be used throughout the patient stay.    Goal: Plan of Care Review/Shift Note  Description: The Plan of Care Review/Shift note should be completed every shift.  The Outcome Evaluation is a brief statement about your assessment that the patient is improving, declining, or no change.  This information will be displayed automatically on your shift note.  Outcome: Ongoing, Progressing  Flowsheets (Taken 4/29/2022 1042)  Plan of Care Reviewed With: patient  Goal: Patient-Specific Goal (Individualized)  Description: You can add care plan individualizations to a care plan. Examples of Individualization might be:  \"Parent requests to be called daily at 9am for status\", \"I have a hard time hearing out of my right ear\", or \"Do not touch me to wake me up as it startles me\".  Outcome: Ongoing, Progressing  Goal: Absence of Hospital-Acquired Illness or Injury  Outcome: Ongoing, Progressing  Intervention: Prevent Skin Injury  Recent Flowsheet Documentation  Taken 4/29/2022 0800 by Zan Azevedo RN  Body Position: position changed independently  Intervention: Prevent and Manage VTE (Venous Thromboembolism) Risk  Recent Flowsheet Documentation  Taken 4/29/2022 0800 by Zan Azevedo RN  Activity Management: ambulated to bathroom  Goal: Optimal Comfort and Wellbeing  Outcome: Ongoing, Progressing  Goal: Readiness for Transition of Care  Outcome: Ongoing, Progressing     Problem: Alcohol Withdrawal  Goal: Alcohol Withdrawal Symptom Control  Outcome: Ongoing, Progressing     Problem: Acute Neurologic Deterioration (Alcohol Withdrawal)  Goal: Optimal Neurologic Function  Outcome: Ongoing, Progressing     Problem: Substance Misuse (Alcohol Withdrawal)  Goal: Readiness for Change Identified  Outcome: Ongoing, Progressing   Goal Outcome Evaluation:    Plan of Care Reviewed With: patient        Pt is alert and oriented x4. Pt is med complaint. Pt denies pain. Pt's ciwa=1 this " morning. Pt has poor appetite. Pt's v/s stable. No complain. Continue to monitor pt.

## 2022-04-29 NOTE — PLAN OF CARE
Problem: Alcohol Withdrawal  Goal: Alcohol Withdrawal Symptom Control  Outcome: Ongoing, Progressing     Problem: Acute Neurologic Deterioration (Alcohol Withdrawal)  Goal: Optimal Neurologic Function  Outcome: Met   Goal Outcome Evaluation:    Fine hand tremors noted when arms are stretched. No anxiety. Alert and oriented x 4. CIWA = 2. Given prn melatonin for insomnia. Complained abdominal pain rated 6/10. Takes scheduled Gabapentin.  Vital signs are stable.

## 2022-04-30 LAB
LIPASE SERPL-CCNC: 1098 U/L (ref 0–52)
MAGNESIUM SERPL-MCNC: 1.4 MG/DL (ref 1.8–2.6)
PHOSPHATE SERPL-MCNC: 1.8 MG/DL (ref 2.5–4.5)
POTASSIUM BLD-SCNC: 3.4 MMOL/L (ref 3.5–5)
POTASSIUM BLD-SCNC: 3.8 MMOL/L (ref 3.5–5)

## 2022-04-30 PROCEDURE — 258N000003 HC RX IP 258 OP 636: Performed by: HOSPITALIST

## 2022-04-30 PROCEDURE — 84132 ASSAY OF SERUM POTASSIUM: CPT | Performed by: HOSPITALIST

## 2022-04-30 PROCEDURE — 36415 COLL VENOUS BLD VENIPUNCTURE: CPT | Performed by: INTERNAL MEDICINE

## 2022-04-30 PROCEDURE — 258N000003 HC RX IP 258 OP 636: Performed by: INTERNAL MEDICINE

## 2022-04-30 PROCEDURE — 99233 SBSQ HOSP IP/OBS HIGH 50: CPT | Performed by: INTERNAL MEDICINE

## 2022-04-30 PROCEDURE — 84100 ASSAY OF PHOSPHORUS: CPT | Performed by: HOSPITALIST

## 2022-04-30 PROCEDURE — 250N000013 HC RX MED GY IP 250 OP 250 PS 637: Performed by: INTERNAL MEDICINE

## 2022-04-30 PROCEDURE — 83690 ASSAY OF LIPASE: CPT | Performed by: INTERNAL MEDICINE

## 2022-04-30 PROCEDURE — 36415 COLL VENOUS BLD VENIPUNCTURE: CPT | Performed by: HOSPITALIST

## 2022-04-30 PROCEDURE — 120N000001 HC R&B MED SURG/OB

## 2022-04-30 PROCEDURE — 250N000013 HC RX MED GY IP 250 OP 250 PS 637: Performed by: HOSPITALIST

## 2022-04-30 PROCEDURE — 83735 ASSAY OF MAGNESIUM: CPT | Performed by: HOSPITALIST

## 2022-04-30 PROCEDURE — 84132 ASSAY OF SERUM POTASSIUM: CPT | Performed by: INTERNAL MEDICINE

## 2022-04-30 RX ORDER — MAGNESIUM OXIDE 400 MG/1
400 TABLET ORAL 2 TIMES DAILY
Status: COMPLETED | OUTPATIENT
Start: 2022-04-30 | End: 2022-05-01

## 2022-04-30 RX ORDER — HYDROMORPHONE HCL IN WATER/PF 6 MG/30 ML
0.2 PATIENT CONTROLLED ANALGESIA SYRINGE INTRAVENOUS
Status: DISCONTINUED | OUTPATIENT
Start: 2022-04-30 | End: 2022-05-02 | Stop reason: HOSPADM

## 2022-04-30 RX ORDER — NALOXONE HYDROCHLORIDE 0.4 MG/ML
0.2 INJECTION, SOLUTION INTRAMUSCULAR; INTRAVENOUS; SUBCUTANEOUS
Status: DISCONTINUED | OUTPATIENT
Start: 2022-04-30 | End: 2022-05-02 | Stop reason: HOSPADM

## 2022-04-30 RX ORDER — POTASSIUM CHLORIDE 1.5 G/1.58G
20 POWDER, FOR SOLUTION ORAL ONCE
Status: COMPLETED | OUTPATIENT
Start: 2022-04-30 | End: 2022-04-30

## 2022-04-30 RX ORDER — ACETAMINOPHEN 325 MG/1
650 TABLET ORAL EVERY 4 HOURS PRN
Status: DISCONTINUED | OUTPATIENT
Start: 2022-04-30 | End: 2022-05-02 | Stop reason: HOSPADM

## 2022-04-30 RX ORDER — TRAMADOL HYDROCHLORIDE 50 MG/1
50 TABLET ORAL EVERY 6 HOURS PRN
Status: DISCONTINUED | OUTPATIENT
Start: 2022-04-30 | End: 2022-05-02 | Stop reason: HOSPADM

## 2022-04-30 RX ORDER — NALOXONE HYDROCHLORIDE 0.4 MG/ML
0.4 INJECTION, SOLUTION INTRAMUSCULAR; INTRAVENOUS; SUBCUTANEOUS
Status: DISCONTINUED | OUTPATIENT
Start: 2022-04-30 | End: 2022-05-02 | Stop reason: HOSPADM

## 2022-04-30 RX ADMIN — THIAMINE HCL TAB 100 MG 100 MG: 100 TAB at 09:31

## 2022-04-30 RX ADMIN — Medication 400 MG: at 10:40

## 2022-04-30 RX ADMIN — POTASSIUM & SODIUM PHOSPHATES POWDER PACK 280-160-250 MG 2 PACKET: 280-160-250 PACK at 20:06

## 2022-04-30 RX ADMIN — POTASSIUM & SODIUM PHOSPHATES POWDER PACK 280-160-250 MG 2 PACKET: 280-160-250 PACK at 13:27

## 2022-04-30 RX ADMIN — FAMOTIDINE 10 MG: 10 TABLET, FILM COATED ORAL at 09:30

## 2022-04-30 RX ADMIN — GABAPENTIN 300 MG: 300 CAPSULE ORAL at 09:31

## 2022-04-30 RX ADMIN — GABAPENTIN 300 MG: 300 CAPSULE ORAL at 16:36

## 2022-04-30 RX ADMIN — Medication 5 MG: at 01:01

## 2022-04-30 RX ADMIN — POTASSIUM & SODIUM PHOSPHATES POWDER PACK 280-160-250 MG 2 PACKET: 280-160-250 PACK at 16:36

## 2022-04-30 RX ADMIN — POTASSIUM & SODIUM PHOSPHATES POWDER PACK 280-160-250 MG 2 PACKET: 280-160-250 PACK at 10:50

## 2022-04-30 RX ADMIN — Medication 1 TABLET: at 09:30

## 2022-04-30 RX ADMIN — POTASSIUM CHLORIDE 20 MEQ: 1.5 POWDER, FOR SOLUTION ORAL at 11:53

## 2022-04-30 RX ADMIN — SODIUM CHLORIDE, POTASSIUM CHLORIDE, SODIUM LACTATE AND CALCIUM CHLORIDE: 600; 310; 30; 20 INJECTION, SOLUTION INTRAVENOUS at 10:59

## 2022-04-30 RX ADMIN — FOLIC ACID 1 MG: 1 TABLET ORAL at 09:30

## 2022-04-30 RX ADMIN — TRAMADOL HYDROCHLORIDE 50 MG: 50 TABLET, COATED ORAL at 10:49

## 2022-04-30 RX ADMIN — GABAPENTIN 600 MG: 300 CAPSULE ORAL at 00:59

## 2022-04-30 RX ADMIN — FAMOTIDINE 10 MG: 10 TABLET, FILM COATED ORAL at 20:07

## 2022-04-30 RX ADMIN — Medication 400 MG: at 20:06

## 2022-04-30 RX ADMIN — SODIUM CHLORIDE, POTASSIUM CHLORIDE, SODIUM LACTATE AND CALCIUM CHLORIDE: 600; 310; 30; 20 INJECTION, SOLUTION INTRAVENOUS at 00:47

## 2022-04-30 ASSESSMENT — ACTIVITIES OF DAILY LIVING (ADL)
ADLS_ACUITY_SCORE: 5

## 2022-04-30 NOTE — PLAN OF CARE
Goal Outcome Evaluation:        Problem: Plan of Care - These are the overarching goals to be used throughout the patient stay.    Goal: Optimal Comfort and Wellbeing  Outcome: Ongoing, Progressing  Intervention: Monitor Pain and Promote Comfort  Recent Flowsheet Documentation  Taken 4/30/2022 1049 by Blanquita Patton RN  Pain Management Interventions:   medication (see MAR)   emotional support  Taken 4/30/2022 0900 by Blanquita Patton RN  Pain Management Interventions:   MD notified (comment)   emotional support   Pt rating abdominal pain #7, Tramadol given with some relief.      Problem: Alcohol Withdrawal  Goal: Alcohol Withdrawal Symptom Control  Outcome: Ongoing, Progressing   CIWA score 2 and 2, mild anxiety noted.      Problem: Oral Intake Inadequate  Goal: Improved Oral Intake  Outcome: Ongoing, Progressing   Lipase 1098 today, diet downgraded to full liquid, Pt displeased with liquid diet but compliant.    Blanquita Patton RN

## 2022-04-30 NOTE — PROGRESS NOTES
"Care Management Follow Up    Length of Stay (days): 2    Expected Discharge Date: 04/30/2022     Concerns to be Addressed: Discharge planning, chemical dependency  Patient plan of care discussed at interdisciplinary rounds: Yes    Anticipated Discharge Disposition:  Home      Anticipated Discharge Services:  Rule 25 assessment  Anticipated Discharge DME:  NA    Education Provided on the Discharge Plan:  Yes  Patient/Family in Agreement with the Plan:  Yes    Referrals Placed by CM/SW:  Rule 25 resources given to Pt  Private pay costs discussed: Not applicable    Additional Information:  SW RUSS met with Pt in room.  Pt was alert and engaged.  Pt resides in house with family and works as a PCA on weekends.  Pt identified periods of sobriety, stating \"I quit on my own\".  Pt open to education regarding chemical health and reports feeling unsure if she has had a Rule 25 in the past.  Due to weekend, Pt given list of Rule 25 resources and education provided on Rule 25 process determining level of care (treatment) needed.  Pt receptive to information and agreeable to requesting care management if she has additional questions or would like additional support.  Pt anticipated to discharge on 5/1/22 to home with family transport. CM to follow for support and/or any additional discharge planning needs.      NI Reagan      "

## 2022-04-30 NOTE — PROGRESS NOTES
Hospitalist Progress Note  ADMIT DATE: 4/28/2022     FACILITY: [facility]    PCP: Shantell Ruffin, [phone]    Assessment/Plan    Myriam Porter is a 31 year old female admitted on 4/28/2022. She has  past medical history of alcohol abuse, chronic anemia, PTSD presented today with chief complaints of nausea and vomiting associated with abdominal pain of 3 days duration.  Patient has a history of alcohol abuse, last drink was 4 days prior to admission following which the symptoms started.  Lipase mildly elevated.  Work-up suggestive of  anion gap metabolic acidosis, alcoholic ketoacidosis, MEENAKSHI and mild pancreatitis.  Ultrasound of the abdomen is unremarkable.     Pancreatitis   - likely alcohol induced  -Patient with history of alcohol abuse, noted to have mildly elevated lipase in 400s.  Abdominal ultrasound fairly unremarkable -lipase trending up  -Continu liquid diet  -IVF, MVI, thiamine, folate, pain control  -Continue to monitor electrolytes     Abdominal pain #secondary to above  -Patient presented with abdominal pain with history of recent binge drinking.  Labs remarkable for mildly elevated lipase.  Ultrasound fairly unremarkable.  -Tolerating liquid diet, back off from soft diet, keep full liquid diet  -Pain control    Hypokalemia  Hypomagnesia  Hypophosphatemia   -replace as per protocol     Nausea/vomiting likely secondary to #1  -Zofran prn ordered     Alcohol withdrawal  -Patient is on and off history of alcohol abuse for the last 4 years.  Last drink was 4 days prior to admission.  Blood alcohol levels on admission is<10  -Monitor on telemetry  -Placed on CIWA protocol, scoring low for now  -IVF, MVI, thiamine, folate  - consulted for CD     Alcoholic gastritis - start H2 blocker     MEENAKSHI likely prerenal given nausea and vomiting  Dehydration-patient looks clinically dehydrated  -Continue IVF  -Minimize nephrotoxins, continue to trend creatinine, trending  down     AGMA, volume depletion, alcoholic ketoacidosis  -improved after bicarb drip, -stop bicarb  -Monitor electrolytes closely       Barriers to Discharge:  worsening pancreatitis; electrolytes imbalance     Anticipated discharge date/Disposition: 1-2 days to home       Subjective  C/o abd pain and back pain, no n/v; tolerating liquid    Objective    Vital signs in last 24 hours  Temp:  [98.4  F (36.9  C)-99.3  F (37.4  C)] 98.5  F (36.9  C)  Pulse:  [100-107] 105  Resp:  [16-18] 18  BP: (108-123)/(63-74) 108/70  SpO2:  [98 %-100 %] 98 % [unfilled] O2 Device: None (Room air)    Weight:   [unfilled] Weight change:     Intake/Output last 3 shifts  I/O last 3 completed shifts:  In: 2125 [P.O.:1120; I.V.:1005]  Out: -   Body mass index is 26.17 kg/m .    Physical Exam    Physical Exam  General appearance: not in acute distress  HEENT: PERRL, EOMI  Lungs: Clear breath sounds in bilateral lung fields  Cardiovascular: Regular rate and rhythm, normal S1-S2  Abdomen: Soft,+tender, no distension,  Musculoskeletal: No joint swelling  Skin: No rash and no edema  Neurology: AAO ×3.  Cranial nerves II - XII normal.  Normal muscle strength in all four extremities.      Pertinent Labs   Lab Results: personally reviewed.    Latest Reference Range & Units 04/30/22 07:36   Potassium 3.5 - 5.0 mmol/L 3.4 (L)   Magnesium 1.8 - 2.6 mg/dL 1.4 (L)   Phosphorus 2.5 - 4.5 mg/dL 1.8 (L)   Lipase 0 - 52 U/L 1,098 (H)   (L): Data is abnormally low  (H): Data is abnormally high    Medications  Scheduled Meds:    famotidine  10 mg Oral BID     folic acid  1 mg Oral Daily     [START ON 5/2/2022] gabapentin  100 mg Oral Q8H     gabapentin  300 mg Oral Q8H     multivitamin w/minerals  1 tablet Oral Daily     potassium & sodium phosphates  2 packet Oral 4x Daily     sodium chloride (PF)  3 mL Intracatheter Q8H     thiamine  100 mg Oral Daily     Continuous Infusions:    lactated ringers 100 mL/hr at 04/30/22 0047     sodium bicabonate in 5%  dextrose for infusion 100 mL/hr at 04/29/22 0600     PRN Meds:.diazepam **OR** diazepam, flumazenil, OLANZapine zydis **OR** haloperidol lactate, lidocaine 4%, lidocaine (buffered or not buffered), melatonin, ondansetron **OR** ondansetron, senna-docusate **OR** senna-docusate, sodium chloride (PF)      Advanced Care Planning:  Discharge planning discussed with patient, [facility]  Mena Berg

## 2022-04-30 NOTE — PLAN OF CARE
Problem: Alcohol Withdrawal  Goal: Alcohol Withdrawal Symptom Control  Outcome: Ongoing, Progressing     Problem: Acute Neurologic Deterioration (Alcohol Withdrawal)  Goal: Optimal Neurologic Function  Outcome: Ongoing, Progressing   Goal Outcome Evaluation:      Pt is on CIWA Protocol, scoring 0 and 1 this shift. She stated she had abdominal and back pain she rated at 7/10, but declined pain meds. She did her own HS cares independently, and changed her gown and slippers. LR running at 100ml/hr.

## 2022-04-30 NOTE — PLAN OF CARE
Goal Outcome Evaluation:  Pt alert and oriented, CIWA scores were 0, up independently in the room.  Has generalized aches but doesn't have anything ordered for pain.  Pt wondering what she can take if she can't have ibuprofen or tylenol.  No nausea.  Potassium level was 2.7 at 1900.  Replacement ordered per protocol and MD ordered additional medication and a recheck in the am.  LR infusing at 100/hr.        Problem: Plan of Care - These are the overarching goals to be used throughout the patient stay.    Goal: Plan of Care Review/Shift Note  Description: The Plan of Care Review/Shift note should be completed every shift.  The Outcome Evaluation is a brief statement about your assessment that the patient is improving, declining, or no change.  This information will be displayed automatically on your shift note.  Outcome: Ongoing, Progressing     Problem: Plan of Care - These are the overarching goals to be used throughout the patient stay.    Goal: Optimal Comfort and Wellbeing  Intervention: Monitor Pain and Promote Comfort  Recent Flowsheet Documentation  Taken 4/29/2022 2000 by Fernanda Leong RN  Pain Management Interventions: emotional support  Taken 4/29/2022 1602 by Fernanda Leong, RN  Pain Management Interventions: emotional support     Problem: Alcohol Withdrawal  Goal: Alcohol Withdrawal Symptom Control  Outcome: Ongoing, Progressing

## 2022-04-30 NOTE — PLAN OF CARE
Problem: Plan of Care - These are the overarching goals to be used throughout the patient stay.    Goal: Readiness for Transition of Care  4/30/2022 1831 by Blanquita Patton RN  Outcome: Ongoing, Progressing  4/30/2022 1313 by Blanquita Patton RN  Outcome: Ongoing, Progressing   Goal Outcome Evaluation:        Pt states looking forward to discharging to home tomorrow, tolerating full liquid diet, states has some abdominal discomfort, declines pain medication.    Blanquita Patton RN

## 2022-04-30 NOTE — PROGRESS NOTES
"CLINICAL NUTRITION SERVICES  -  ASSESSMENT NOTE      RECOMMENDATIONS FOR MD/PROVIDER TO ORDER:   None     Recommendations Ordered by Registered Dietitian (RD):   Phos, mag, K labs w/ cosign as patient is at risk for refeeding syndrome  2 ptks Neutra phos QID w/ cosign as pt has a low phosphorus level    Adjusted diet order to current IDDSI language (Easy to Chew level 7)     Future/Additional Recommendations:   Consider adding CHO restriction dt signs of refeeding syndrome     Malnutrition:   Patient does not meet criteria for malnutrition         REASON FOR ASSESSMENT  Myriam Porter is a 31 year old female seen by Registered Dietitian for Admission Nutrition Risk Screen for positive    Patient presents with MEENAKSHI , alcohol induced pancreatitis, acidosis, alcoholic ketoacidosis, alcohol withdrawal.     NUTRITION HISTORY  - Information obtained from patient and chart  - Patient is on a Regular diet at home  - Typical food/fluid intake is soup, salad, grilled chicken, baked chips  - Diet history patient has been eating < 50% usual intake for 1 week  - Supplements none    NKFA      CURRENT NUTRITION ORDERS  Diet Order:     Mechanical/Dental Soft       Current Intake/Tolerance:  Patient is eating 100% of meals per nursing records and received an estimated 3081kcals and 120g protein today.       NUTRITION FOCUSED PHYSICAL ASSESSMENT FOR DIAGNOSING MALNUTRITION)  Yes           Observed:    Muscle wasting (refer to documentation in Malnutrition section) and Subcutaneous fat loss (refer to documentation in Malnutrition section)    Obtained from Chart/Interdisciplinary Team:  No abnormal findings    ANTHROPOMETRICS  Height: 5' 0\" vs 5' 1'' on 3/10/21  Weight: 134 lbs 0 oz  Body mass index is 26.17 kg/m .  Weight Status:  Overweight BMI 25-29.9  IBW: 45.5kg   % IBW: 134%  Weight History:   Wt Readings from Last 30 Encounters:   04/27/22 60.8 kg (134 lb)   01/26/22 58.1 kg (128 lb 1.4 oz)   03/10/2021      57.2 kg (126 " lb)      LABS  Labs reviewed: K 2.7, UN 3, alb 3.3, ast 72, hgb 11.6    MEDICATIONS  Medications reviewed: pepcid, folvite, klor-con m, thiamine/folic acid infusion, thiamine, lactated ringers, sodium bicarbonate       ASSESSED NUTRITION NEEDS PER APPROVED PRACTICE GUIDELINES:    Dosing Weight 60.8 kg (134 lb) and ABW 49.3kg    Estimated Energy Needs: 1740 kcals ([place] [address] Jeor stress factor 1.4)  Justification: maintenance and pancreatitis  Estimated Protein Needs: 74 grams protein (1.5 g pro/Kg)  Justification: Repletion, hypercatabolism with acute illness and preservation of lean body mass  Estimated Fluid Needs: 1783-4450  mL (30-35 mL/kg)  Justification: maintenance    MALNUTRITION:  % Weight Loss:  None noted  % Intake:  Decreased intake does not meet criteria for malnutrition as patient has exceeded estimated energy needs today  Subcutaneous Fat Loss:  Upper arm region mild depletion  Muscle Loss:  Clavicle bone region mild depletion  Fluid Retention:  None noted    Malnutrition Diagnosis: Patient does not meet two of the above criteria necessary for diagnosing malnutrition      NUTRITION DIAGNOSIS:  Altered nutrition-related lab value related to suspected refeeding syndrome as evidenced by K 2.7, Phos 1.2      NUTRITION INTERVENTIONS  Recommendations / Nutrition Prescription  Phos, mag, K labs w/ cosign as patient is at risk for refeeding syndrome  2 ptks Neutra phos QID w/ cosign as pt has a low phosphorus level    .      Implementation  Nutrition education: No education needs assessed at this time  Multivitamin/Mineral  .      Nutrition Goals  Electrolytes WNL  .      MONITORING AND EVALUATION:  Progress towards goals will be monitored and evaluated per protocol and Practice Guidelines, Diet Order, Food intake, Vitamin intake, Weight, Biochemical data and Nutrition-focused physical findings

## 2022-05-01 LAB
LIPASE SERPL-CCNC: 785 U/L (ref 0–52)
POTASSIUM BLD-SCNC: 3.8 MMOL/L (ref 3.5–5)

## 2022-05-01 PROCEDURE — 250N000013 HC RX MED GY IP 250 OP 250 PS 637: Performed by: HOSPITALIST

## 2022-05-01 PROCEDURE — 83690 ASSAY OF LIPASE: CPT | Performed by: INTERNAL MEDICINE

## 2022-05-01 PROCEDURE — 99233 SBSQ HOSP IP/OBS HIGH 50: CPT | Performed by: INTERNAL MEDICINE

## 2022-05-01 PROCEDURE — 120N000001 HC R&B MED SURG/OB

## 2022-05-01 PROCEDURE — 84132 ASSAY OF SERUM POTASSIUM: CPT | Performed by: INTERNAL MEDICINE

## 2022-05-01 PROCEDURE — 36415 COLL VENOUS BLD VENIPUNCTURE: CPT | Performed by: INTERNAL MEDICINE

## 2022-05-01 PROCEDURE — 250N000011 HC RX IP 250 OP 636: Performed by: INTERNAL MEDICINE

## 2022-05-01 PROCEDURE — 250N000013 HC RX MED GY IP 250 OP 250 PS 637: Performed by: INTERNAL MEDICINE

## 2022-05-01 RX ADMIN — POTASSIUM & SODIUM PHOSPHATES POWDER PACK 280-160-250 MG 2 PACKET: 280-160-250 PACK at 16:19

## 2022-05-01 RX ADMIN — HYDROMORPHONE HYDROCHLORIDE 0.2 MG: 0.2 INJECTION, SOLUTION INTRAMUSCULAR; INTRAVENOUS; SUBCUTANEOUS at 09:43

## 2022-05-01 RX ADMIN — Medication 400 MG: at 09:20

## 2022-05-01 RX ADMIN — THIAMINE HCL TAB 100 MG 100 MG: 100 TAB at 09:20

## 2022-05-01 RX ADMIN — FAMOTIDINE 10 MG: 10 TABLET, FILM COATED ORAL at 20:04

## 2022-05-01 RX ADMIN — Medication 400 MG: at 20:05

## 2022-05-01 RX ADMIN — FOLIC ACID 1 MG: 1 TABLET ORAL at 09:20

## 2022-05-01 RX ADMIN — GABAPENTIN 300 MG: 300 CAPSULE ORAL at 16:19

## 2022-05-01 RX ADMIN — POTASSIUM & SODIUM PHOSPHATES POWDER PACK 280-160-250 MG 2 PACKET: 280-160-250 PACK at 09:22

## 2022-05-01 RX ADMIN — GABAPENTIN 300 MG: 300 CAPSULE ORAL at 09:20

## 2022-05-01 RX ADMIN — POTASSIUM & SODIUM PHOSPHATES POWDER PACK 280-160-250 MG 2 PACKET: 280-160-250 PACK at 13:13

## 2022-05-01 RX ADMIN — HYDROMORPHONE HYDROCHLORIDE 0.2 MG: 0.2 INJECTION, SOLUTION INTRAMUSCULAR; INTRAVENOUS; SUBCUTANEOUS at 13:23

## 2022-05-01 RX ADMIN — HYDROMORPHONE HYDROCHLORIDE 0.2 MG: 0.2 INJECTION, SOLUTION INTRAMUSCULAR; INTRAVENOUS; SUBCUTANEOUS at 16:29

## 2022-05-01 RX ADMIN — POTASSIUM & SODIUM PHOSPHATES POWDER PACK 280-160-250 MG 2 PACKET: 280-160-250 PACK at 20:05

## 2022-05-01 RX ADMIN — GABAPENTIN 300 MG: 300 CAPSULE ORAL at 00:16

## 2022-05-01 RX ADMIN — Medication 1 TABLET: at 09:20

## 2022-05-01 RX ADMIN — FAMOTIDINE 10 MG: 10 TABLET, FILM COATED ORAL at 09:20

## 2022-05-01 RX ADMIN — TRAMADOL HYDROCHLORIDE 50 MG: 50 TABLET, COATED ORAL at 20:12

## 2022-05-01 ASSESSMENT — ACTIVITIES OF DAILY LIVING (ADL)
ADLS_ACUITY_SCORE: 5

## 2022-05-01 NOTE — PLAN OF CARE
Problem: Plan of Care - These are the overarching goals to be used throughout the patient stay.    Goal: Optimal Comfort and Wellbeing  Outcome: Ongoing, Progressing   Goal Outcome Evaluation:      Pt rating abdominal pain #7, Dilaudid given PRN with some relief.      Problem: Alcohol Withdrawal  Goal: Alcohol Withdrawal Symptom Control  Outcome: Ongoing, Progressing     CIWA score 4 and 2, Pt states anxiety and restlessness regarding full liquid diet, diet later advanced to regular.    Blanquita Patton RN

## 2022-05-01 NOTE — PROGRESS NOTES
Care Management Follow Up    Length of Stay (days): 3    Expected Discharge Date: 05/02/2022     Concerns to be Addressed:  Mental health support/resources, discharge planning     Patient plan of care discussed at interdisciplinary rounds: Yes    Anticipated Discharge Disposition:  Home     Anticipated Discharge Services:  Outpatient care/follow up  Anticipated Discharge DME:  NA    Patient/family educated on Medicare website which has current facility and service quality ratings:    Education Provided on the Discharge Plan:  Yes  Patient/Family in Agreement with the Plan:  Yes    Referrals Placed by CM/SAGE:  Provided Pt with Rule 25/chemical health resources; due to weekend unable to schedule appt.   Private pay costs discussed: Not applicable    Additional Information:  SAGE SOLANO met with Pt in room after chart review to discuss mental health support.  Pt has history of anxiety, PTSD, and depressive disorder.  Pt open to talking with CM; reports that she previously engaged in therapy and is supposed to resume therapy this month. Pt has insight as to how symptoms of mental health impact her overall day to day functioning.  Pt anticipated to discharge home tomorrow (5/2/22) with family to transport.  CM will remain available to provide additional support as needed/requested.      KRISTEN ReaganSW

## 2022-05-01 NOTE — PROGRESS NOTES
Hospitalist Progress Note  ADMIT DATE: 4/28/2022     FACILITY: [facility]    PCP: Shantell Ruffin, [phone]    Assessment/Plan    Myriam Porter is a 31 year old female admitted on 4/28/2022. She has  past medical history of alcohol abuse, chronic anemia, PTSD presented today with chief complaints of nausea and vomiting associated with abdominal pain of 3 days duration.  Patient has a history of alcohol abuse, last drink was 4 days prior to admission following which the symptoms started.  Lipase elevated.  Work-up suggestive of  anion gap metabolic acidosis, alcoholic ketoacidosis, MEENAKSHI and mild pancreatitis.  Ultrasound of the abdomen is unremarkable.     Pancreatitis   - likely alcohol induced  -Patient with history of alcohol abuse, noted to have mildly elevated lipase in 400s.  Abdominal ultrasound fairly unremarkable -lipase trending up 4/29 and down 5/1  -Continu liquid diet; pt is not happy and would like to be on regular diet; Since lipase down, advance to regular diet as tolerated  -IVF, MVI, thiamine, folate, pain control  -Continue to monitor electrolytes     Abdominal pain #secondary to above  -Patient presented with abdominal pain with history of recent binge drinking.  Labs remarkable for mildly elevated lipase.  Ultrasound fairly unremarkable.  -still in pain. Pain control     Hypokalemia -improved  Hypomagnesia  Hypophosphatemia   -replace as per protocol     Nausea/vomiting likely secondary to #1  -Zofran prn ordered     Alcohol withdrawal  -Patient is on and off history of alcohol abuse for the last 4 years.  Last drink was 4 days prior to admission.  Blood alcohol levels on admission is<10  -Monitor on telemetry  -Placed on CIWA protocol, scoring low for now  -IVF, MVI, thiamine, folate  - consulted for CD     Alcoholic gastritis - start H2 blocker     MEENAKSHI likely prerenal given nausea and vomiting  Dehydration-  Resolved after IVF     AGMA, volume  depletion, alcoholic ketoacidosis  -improved after bicarb drip, -stop bicarb  -Monitor electrolytes closely        Barriers to Discharge:  still abd pain with elevated Lipase at 785     Anticipated discharge date/Disposition: 5/2 to home      Subjective  Still same abd pain and back pain.Very upset about the diet.     Objective    Vital signs in last 24 hours  Temp:  [97.4  F (36.3  C)-98.8  F (37.1  C)] 98.8  F (37.1  C)  Pulse:  [] 88  Resp:  [16-18] 18  BP: (114-135)/(62-87) 120/87  SpO2:  [98 %-100 %] 100 % [unfilled] O2 Device: None (Room air)    Weight:   [unfilled] Weight change:     Intake/Output last 3 shifts  I/O last 3 completed shifts:  In: 1776 [P.O.:720; I.V.:1056]  Out: -   Body mass index is 26.17 kg/m .    Physical Exam    Physical Exam  General appearance: not in acute distress  HEENT: PERRL, EOMI  Lungs: Clear breath sounds in bilateral lung fields  Cardiovascular: Regular rate and rhythm, normal S1-S2  Abdomen: Soft, epigastric tender, no distension, normal bowel sound  Musculoskeletal: No joint swelling  Skin: No rash and no edema  Neurology: grossly intact      Pertinent Labs   Lab Results: personally reviewed.    Latest Reference Range & Units 05/01/22 07:53   Potassium 3.5 - 5.0 mmol/L 3.8   Lipase 0 - 52 U/L 785 (H)   (H): Data is abnormally high    Medications  Scheduled Meds:    famotidine  10 mg Oral BID     folic acid  1 mg Oral Daily     [START ON 5/2/2022] gabapentin  100 mg Oral Q8H     gabapentin  300 mg Oral Q8H     magnesium oxide  400 mg Oral BID     multivitamin w/minerals  1 tablet Oral Daily     potassium & sodium phosphates  2 packet Oral 4x Daily     sodium chloride (PF)  3 mL Intracatheter Q8H     thiamine  100 mg Oral Daily     Continuous Infusions:    lactated ringers Stopped (04/30/22 1637)     PRN Meds:.acetaminophen, diazepam **OR** diazepam, flumazenil, OLANZapine zydis **OR** haloperidol lactate, HYDROmorphone, lidocaine 4%, lidocaine (buffered or not  buffered), melatonin, naloxone **OR** naloxone **OR** naloxone **OR** naloxone, ondansetron **OR** ondansetron, senna-docusate **OR** senna-docusate, sodium chloride (PF), traMADol      Advanced Care Planning:  Discharge planning discussed with patient         [facility] Medicine Service  Mena Berg

## 2022-05-01 NOTE — PLAN OF CARE
Problem: Plan of Care - These are the overarching goals to be used throughout the patient stay.    Goal: Plan of Care Review/Shift Note  Description: The Plan of Care Review/Shift note should be completed every shift.  The Outcome Evaluation is a brief statement about your assessment that the patient is improving, declining, or no change.  This information will be displayed automatically on your shift note.  5/1/2022 0418 by Jennifer Field, RN  Outcome: Ongoing, Progressing  5/1/2022 0400 by Jennifer Field, RN  Outcome: Ongoing, Progressing     Goal Outcome Evaluation:    Patient slept well overnight. CIWA score 0 for the shift. Denies pain. Vitals signs stable. Independent in room. Took all her bedtime medications. Will continue to monitor.

## 2022-05-02 VITALS
SYSTOLIC BLOOD PRESSURE: 104 MMHG | DIASTOLIC BLOOD PRESSURE: 70 MMHG | OXYGEN SATURATION: 97 % | WEIGHT: 134 LBS | HEART RATE: 94 BPM | HEIGHT: 60 IN | TEMPERATURE: 99.1 F | RESPIRATION RATE: 18 BRPM | BODY MASS INDEX: 26.31 KG/M2

## 2022-05-02 LAB
HOLD SPECIMEN: NORMAL
LIPASE SERPL-CCNC: 822 U/L (ref 0–52)
MAGNESIUM SERPL-MCNC: 1.6 MG/DL (ref 1.8–2.6)
PHOSPHATE SERPL-MCNC: 4.9 MG/DL (ref 2.5–4.5)
POTASSIUM BLD-SCNC: 3.9 MMOL/L (ref 3.5–5)

## 2022-05-02 PROCEDURE — 250N000013 HC RX MED GY IP 250 OP 250 PS 637: Performed by: HOSPITALIST

## 2022-05-02 PROCEDURE — 84100 ASSAY OF PHOSPHORUS: CPT | Performed by: INTERNAL MEDICINE

## 2022-05-02 PROCEDURE — 250N000013 HC RX MED GY IP 250 OP 250 PS 637

## 2022-05-02 PROCEDURE — 250N000013 HC RX MED GY IP 250 OP 250 PS 637: Performed by: INTERNAL MEDICINE

## 2022-05-02 PROCEDURE — 99239 HOSP IP/OBS DSCHRG MGMT >30: CPT | Performed by: INTERNAL MEDICINE

## 2022-05-02 PROCEDURE — 84132 ASSAY OF SERUM POTASSIUM: CPT | Performed by: HOSPITALIST

## 2022-05-02 PROCEDURE — 83735 ASSAY OF MAGNESIUM: CPT | Performed by: INTERNAL MEDICINE

## 2022-05-02 PROCEDURE — 83690 ASSAY OF LIPASE: CPT | Performed by: INTERNAL MEDICINE

## 2022-05-02 PROCEDURE — 36415 COLL VENOUS BLD VENIPUNCTURE: CPT | Performed by: INTERNAL MEDICINE

## 2022-05-02 RX ORDER — AMOXICILLIN 250 MG
1 CAPSULE ORAL 2 TIMES DAILY PRN
COMMUNITY
Start: 2022-05-02

## 2022-05-02 RX ORDER — FOLIC ACID 1 MG/1
1 TABLET ORAL DAILY
COMMUNITY
Start: 2022-05-03

## 2022-05-02 RX ORDER — FAMOTIDINE 10 MG
10 TABLET ORAL 2 TIMES DAILY
Qty: 28 TABLET | Refills: 0 | Status: SHIPPED | OUTPATIENT
Start: 2022-05-02 | End: 2022-05-16

## 2022-05-02 RX ORDER — MULTIPLE VITAMINS W/ MINERALS TAB 9MG-400MCG
1 TAB ORAL DAILY
COMMUNITY
Start: 2022-05-03

## 2022-05-02 RX ORDER — MENTHOL AND METHYL SALICYLATE 7.6; 29 G/100G; G/100G
OINTMENT TOPICAL EVERY 6 HOURS PRN
Status: DISCONTINUED | OUTPATIENT
Start: 2022-05-02 | End: 2022-05-02 | Stop reason: HOSPADM

## 2022-05-02 RX ORDER — ACETAMINOPHEN 325 MG/1
650 TABLET ORAL EVERY 4 HOURS PRN
COMMUNITY
Start: 2022-05-02

## 2022-05-02 RX ORDER — MAGNESIUM OXIDE 400 MG/1
400 TABLET ORAL DAILY
Qty: 5 TABLET | Refills: 0 | Status: SHIPPED | OUTPATIENT
Start: 2022-05-02 | End: 2022-05-07

## 2022-05-02 RX ADMIN — MENTHOL AND METHYL SALICYLATE 1 G: 7.6; 29 OINTMENT TOPICAL at 08:53

## 2022-05-02 RX ADMIN — GABAPENTIN 300 MG: 300 CAPSULE ORAL at 00:18

## 2022-05-02 RX ADMIN — THIAMINE HCL TAB 100 MG 100 MG: 100 TAB at 08:51

## 2022-05-02 RX ADMIN — TRAMADOL HYDROCHLORIDE 50 MG: 50 TABLET, COATED ORAL at 08:51

## 2022-05-02 RX ADMIN — FOLIC ACID 1 MG: 1 TABLET ORAL at 08:52

## 2022-05-02 RX ADMIN — FAMOTIDINE 10 MG: 10 TABLET, FILM COATED ORAL at 08:51

## 2022-05-02 RX ADMIN — MENTHOL AND METHYL SALICYLATE: 7.6; 29 OINTMENT TOPICAL at 01:16

## 2022-05-02 RX ADMIN — Medication 1 TABLET: at 08:51

## 2022-05-02 RX ADMIN — GABAPENTIN 100 MG: 100 CAPSULE ORAL at 08:51

## 2022-05-02 ASSESSMENT — ACTIVITIES OF DAILY LIVING (ADL)
ADLS_ACUITY_SCORE: 5

## 2022-05-02 NOTE — DISCHARGE SUMMARY
[facility] MEDICINE  DISCHARGE SUMMARY     Primary Care Physician: Shantell Ruffin              Admission Date: 4/28/2022   Discharge Provider: Mena Berg Discharge Date: 5/2/2022   Diet:   Active Diet and Nourishment Order   Procedures     Combination Diet Regular Diet     Diet         Activity: DCACTIVITY: Activity as tolerated      Code Status: Full Code         Condition at Discharge: improving      REASON FOR PRESENTATION(See Admission Note for Details)   Abdominal pain  Pancreatitis    PRINCIPAL & ACTIVE DISCHARGE DIAGNOSES     Active Problems:    Ketoacidosis    Pancreatitis    Acute kidney injury (H)    Alcohol-induced acute pancreatitis without infection or necrosis      SIGNIFICANT FINDINGS (Imaging, labs):   EXAM: US ABDOMEN LIMITED  LOCATION: [facility]  DATE/TIME: 4/28/2022 3:19 AM     INDICATION: RUQpain  COMPARISON: Ultrasound 07/01/2020                                                                   IMPRESSION:  1.  Fatty infiltration of the liver.     2.  Normal gallbladder.     3.  No dilatation of the bile ducts.      Latest Reference Range & Units 04/29/22 12:44   Sodium 136 - 145 mmol/L 135 (L)   Potassium 3.5 - 5.0 mmol/L 2.5 (LL)   Chloride 98 - 107 mmol/L 91 (L)   Carbon Dioxide 22 - 31 mmol/L 29   Urea Nitrogen 8 - 22 mg/dL 3 (L)   Creatinine 0.60 - 1.10 mg/dL 0.65   GFR Estimate >60 mL/min/1.73m2 >90 [1]   Calcium 8.5 - 10.5 mg/dL 8.6   Anion Gap 5 - 18 mmol/L 15   Albumin 3.5 - 5.0 g/dL 3.3 (L)   Protein Total 6.0 - 8.0 g/dL 6.7   Bilirubin Total 0.0 - 1.0 mg/dL 1.0   Alkaline Phosphatase 45 - 120 U/L 110   ALT 0 - 45 U/L 26   AST 0 - 40 U/L 72 (H)   Lipase 0 - 52 U/L 729 (H)   (L): Data is abnormally low  (H): Data is abnormally high  (LL): Data is critically low  [1] Effective December 21, 2021 eGFRcr in adults is calculated using the 2021 CKD-EPI creatinine equation which includes age and gender (Cely et al., NEJ, DOI:  10.1056/EMEFhk8115182)    PENDING LABS     none    PROCEDURES ( this hospitalization only)      none    RECOMMENDATION FOR F/U VISIT     Follow up with primary care physician within 1 week  Follow up with GI as scheduled    DISPOSITION     Home    SUMMARY OF HOSPITAL COURSE:      Myriam Porter is a 31 year old female admitted on 4/28/2022. She has  past medical history of alcohol abuse, chronic anemia, PTSD presented today with chief complaints of nausea and vomiting associated with abdominal pain of 3 days duration.  Patient has a history of alcohol abuse, last drink was 4 days prior to admission following which the symptoms started.  Lipase elevated.  Work-up suggestive of  anion gap metabolic acidosis, alcoholic ketoacidosis, MEENAKSHI and mild pancreatitis.  Ultrasound of the abdomen is unremarkable.     Pancreatitis   - likely alcohol induced  -Patient with history of alcohol abuse, noted to have mildly elevated lipase in 400s.  Abdominal ultrasound fairly unremarkable -  lipase trending up 4/29 and down 5/1  -Continu liquid diet; advance to regular diet as tolerated  -IVF, MVI, thiamine, folate, pain control  -Continue to monitor electrolytes and replace  -follow up with pcp within 1 week with rechecking bmp, mag, lipase     Abdominal pain #secondary to above  -Patient presented with abdominal pain with history of recent binge drinking.  Labs remarkable for mildly elevated lipase.  Ultrasound fairly unremarkable.  -Pain control     Hypokalemia -improved  Hypomagnesia  -replace as per protocol     Nausea/vomiting likely secondary to #1  -Zofran prn ordered     Alcohol withdrawal  -Patient is on and off history of alcohol abuse for the last 4 years.  Last drink was 4 days prior to admission.  Blood alcohol levels on admission is<10  -Monitor on telemetry  -Placed on CIWA protocol, scoring low for now  -IVF, MVI, thiamine, folate  - consulted for CD     Alcoholic gastritis - start H2  blocker     MEENAKSHI likely prerenal given nausea and vomiting  Dehydration-  Resolved after IVF     AGMA, volume depletion, alcoholic ketoacidosis  -improved after bicarb drip, -stop bicarb  -Monitor electrolytes closely      Anticipated discharge date/Disposition: 5/2 to home       Patient's other chronic medical conditions are stable and home medications were continued.    Discharge Medications with Med changes:       Current Discharge Medication List      START taking these medications    Details   acetaminophen (TYLENOL) 325 MG tablet Take 2 tablets (650 mg) by mouth every 4 hours as needed for mild pain or fever    Associated Diagnoses: Alcohol-induced acute pancreatitis without infection or necrosis      famotidine (PEPCID) 10 MG tablet Take 1 tablet (10 mg) by mouth 2 times daily for 14 days  Qty: 28 tablet, Refills: 0    Associated Diagnoses: Acute alcoholic gastritis without hemorrhage      folic acid (FOLVITE) 1 MG tablet Take 1 tablet (1 mg) by mouth daily    Associated Diagnoses: Alcoholic liver damage (H)      magnesium oxide (MAG-OX) 400 MG tablet Take 1 tablet (400 mg) by mouth daily for 5 days  Qty: 5 tablet, Refills: 0    Associated Diagnoses: Hypomagnesemia      multivitamin w/minerals (THERA-VIT-M) tablet Take 1 tablet by mouth daily    Associated Diagnoses: Alcoholic liver damage (H)      senna-docusate (SENOKOT-S/PERICOLACE) 8.6-50 MG tablet Take 1 tablet by mouth 2 times daily as needed for constipation    Associated Diagnoses: Chronic idiopathic constipation               Rationale for medication changes:      Hypomagnesia  Pancreatitis  constipation    Patient's long term medication were not changed during this hospitalization.    Consults   none      Discharge Procedure Orders   Reason for your hospital stay   Order Comments: Alcoholic pancreatitis     Follow-up and recommended labs and tests    Order Comments: Follow up with primary care provider, Shantell Ruffin, within 7 days for hospital  follow- up.  The following labs/tests are recommended: CBC, BMP, Magnesium and lipase.      Follow up with GI as instructed     Activity   Order Comments: Your activity upon discharge: activity as tolerated     Order Specific Question Answer Comments   Is discharge order? Yes      When to contact your care team   Order Comments: Call your primary doctor if you have any of the following: temperature greater than 100.5f or less than 96f, increased pain, or nausea and vomiting or any other concerns.     Discharge Instructions   Order Comments: No alcohol!     Diet   Order Comments: Follow this diet upon discharge: Orders Placed This Encounter      Combination Diet Regular Diet; full liquid, advance as tolerated     Order Specific Question Answer Comments   Is discharge order? Yes      Examination     Vital Signs in last 24 hours:   vss    General appearance: Not in acute distress  HEENT: PERRL, EOMI  Neck: Supple, no JVD  Lungs: Clear breath sounds in bilateral lung fields  Cardiovascular: Regular rate and rhythm, normal S1-S2  Abdomen: Soft, non tender, no distension  Musculoskeletal: No joint swelling  Skin: No rash and no edema  Neurology: AAO ×3.  Cranial nerves II - XII normal.  Normal muscle strength in all four extremities.        Please see EMR for more detailed significant labs, imaging, consultant notes etc.    Total time spent on discharge: 50 minutes    Mena (Luisito) MD [place]  [facility] Service: Ph:[phone]    CC:Shantell Ruffin   Latest Reference Range & Units 05/02/22 07:25   Potassium 3.5 - 5.0 mmol/L 3.9   Magnesium 1.8 - 2.6 mg/dL 1.6 (L)   Phosphorus 2.5 - 4.5 mg/dL 4.9 (H)   Lipase 0 - 52 U/L 822 (H)   (L): Data is abnormally low  (H): Data is abnormally high

## 2022-05-02 NOTE — PLAN OF CARE
Problem: Alcohol Withdrawal  Goal: Alcohol Withdrawal Symptom Control  Outcome: Ongoing, Progressing   Pt scored a 1 on the CIWA scale x2 for agitation related to her abdominal pain.   Problem: Plan of Care - These are the overarching goals to be used throughout the patient stay.    Goal: Optimal Comfort and Wellbeing  Outcome: Ongoing, Progressing  Intervention: Monitor Pain and Promote Comfort  Pt had reported her pain has been consistently at 7/10 for the past couple days and states that the pain medication that she has been taking here has not been very effective and states that she does not like to take medication. Pt refused tylenol and dilaudid. Paged to request t-pump for abd discomfort and got the order however store room did not have any available. Also got a order for icy hot which was effective for a short while bringing her pain to 5/10.

## 2022-05-02 NOTE — PLAN OF CARE
Goal: Absence of Hospital-Acquired Illness or Injury  Outcome: Ongoing, Progressing  Intervention: Identify and Manage Fall Risk  Recent Flowsheet Documentation  Taken 5/1/2022 1620 by José Miguel Snow RN  Safety Promotion/Fall Prevention:   lighting adjusted   nonskid shoes/slippers when out of bed   safety round/check completed  Intervention: Prevent Skin Injury  Recent Flowsheet Documentation  Taken 5/1/2022 2012 by José Miguel Snow RN  Body Position: position changed independently  Intervention: Prevent and Manage VTE (Venous Thromboembolism) Risk  Recent Flowsheet Documentation  Taken 5/1/2022 2012 by José Miguel Snow RN  Activity Management: activity adjusted per tolerance  Goal: Optimal Comfort and Wellbeing  Outcome: Ongoing, Progressing  Intervention: Monitor Pain and Promote Comfort  Recent Flowsheet Documentation  Taken 5/1/2022 2012 by José Miguel Snow RN  Pain Management Interventions: medication (see MAR)     Problem: Alcohol Withdrawal  Goal: Alcohol Withdrawal Symptom Control  Outcome: Ongoing, Progressing     Problem: Oral Intake Inadequate  Goal: Improved Oral Intake  Outcome: Ongoing, Progressing     Pt's pain is managed with dilaudid and tramadol. Denies nausea, tolerating diet. Independent in room. CIWA scores 0 and 0. Pt is hopeful to discharge tomorrow.

## 2022-05-03 ENCOUNTER — PATIENT OUTREACH (OUTPATIENT)
Dept: CARE COORDINATION | Facility: CLINIC | Age: 32
End: 2022-05-03
Payer: COMMERCIAL

## 2022-05-03 DIAGNOSIS — Z71.89 OTHER SPECIFIED COUNSELING: ICD-10-CM

## 2022-05-03 NOTE — PROGRESS NOTES
Clinic Care Coordination Contact  [facility]/Voicemail       Clinical Data: Care Coordinator Outreach  Outreach attempted x 1.  Unable to leave  message on patient's voicemail with call back information.  Plan: Care Coordinator will try to reach patient again in 1-2 business days.    BRIANA Liao  Connected Care Resource Center  [facility]     *Connected Care Resource Team does NOT follow patient ongoing. Referrals are identified based on internal discharge reports and the outreach is to ensure patient has an understanding of their discharge instructions.

## 2022-05-04 NOTE — PROGRESS NOTES
Clinic Care Coordination Contact  [facility]/Voicemail       Clinical Data: Care Coordinator Outreach  Outreach attempted x 2.  Unable to leave message on patient's voicemail with call back information.    Plan: Care Coordinator will do no further outreaches at this time.    BRIANA Liao  Connected Care Resource Center  [facility]     *Connected Care Resource Team does NOT follow patient ongoing. Referrals are identified based on internal discharge reports and the outreach is to ensure patient has an understanding of their discharge instructions.